# Patient Record
Sex: FEMALE | Race: WHITE | HISPANIC OR LATINO | Employment: UNEMPLOYED | URBAN - METROPOLITAN AREA
[De-identification: names, ages, dates, MRNs, and addresses within clinical notes are randomized per-mention and may not be internally consistent; named-entity substitution may affect disease eponyms.]

---

## 2017-06-21 ENCOUNTER — ALLSCRIPTS OFFICE VISIT (OUTPATIENT)
Dept: OTHER | Facility: OTHER | Age: 32
End: 2017-06-21

## 2017-09-01 ENCOUNTER — ALLSCRIPTS OFFICE VISIT (OUTPATIENT)
Dept: OTHER | Facility: OTHER | Age: 32
End: 2017-09-01

## 2017-09-01 DIAGNOSIS — E66.9 OBESITY: ICD-10-CM

## 2017-10-25 NOTE — PROGRESS NOTES
Assessment  1  Asthma exacerbation (493 92) (J45 901)   2  Cigarette smoker (305 1) (F17 210)   3  Adjustment disorder with anxiety (309 24) (F43 22)   4  Obese (278 00) (E66 9)    Plan   Asthma exacerbation    · Proventil  (90 Base) MCG/ACT Inhalation Aerosol Solution; 2 puffs qid prn dyspnea  Obese    · (1) CBC/PLT/DIFF; Status:Active; Requested for:01Sep2017;    · (1) COMPREHENSIVE METABOLIC PANEL; Status:Active; Requested for:01Sep2017;    · (1) HEMOGLOBIN A1C; Status:Active; Requested for:01Sep2017;    · (1) LIPID PANEL, FASTING; Status:Active; Requested for:01Sep2017;    · (1) TSH; Status:Active; Requested for:01Sep2017;    · (1) URINALYSIS w URINE C/S REFLEX (will reflex a microscopy if leukocytes, occult blood, or  nitrites are not within normal limits); Status:Active; Requested for:01Sep2017;    · Follow-up PRN Evaluation and Treatment  Follow-up  Status: Complete  Done: [id number]    ALPRAZolam 0 5 MG Oral Tablet; TAKE 1 TABLET 3 TIMES DAILY AS NEEDED; Therapy: 21Jun2017 to (Evaluate:11Sep2017); Last Rx:01Sep2017; Status: ACTIVE Ordered  Rx By: Maranda Corcoran; Dispense: 10 Days ; #:30 Tablet; Refill: 0;   For: Adjustment disorder with anxiety; KHANH = N; Print Rx     Discussion/Summary  Discussion Summary:   Renewed her Xanax getting screening labs  Medication SE Review and Pt Understands Tx: Possible side effects of new medications were reviewed with the patient/guardian today  The treatment plan was reviewed with the patient/guardian  The patient/guardian understands and agrees with the treatment plan      Chief Complaint  Chief Complaint Free Text Note Form: Anxiety      History of Present Illness  HPI: He continues to have occasional anxiety symptoms  A lot of stress with her twin sister's medical problems  She takes Xanax infrequently but it helps her anxiety symptoms  She has no other complaints  She has asthma which is well controlled   Asthma (Follow-Up):  The patient is being seen for a routine clinic follow-up of asthma  The patient states she has been doing well with her asthma control since the last visit  She has no comorbid illnesses  Interval Symptoms:   Disease Management: the patient is doing well with her asthma goals  Obesity (Follow-Up): The patient is being seen for follow-up of obesity  The patient reports no change in the condition  She has had no significant interval events  The patient is currently asymptomatic  Review of Systems  Complete-Female:   Constitutional: No fever, no chills, feels well, no tiredness, no recent weight gain or weight loss  Eyes: No complaints of eye pain, no red eyes, no eyesight problems, no discharge, no dry eyes, no itching of eyes  ENT: no complaints of earache, no loss of hearing, no nose bleeds, no nasal discharge, no sore throat, no hoarseness  Cardiovascular: No complaints of slow heart rate, no fast heart rate, no chest pain, no palpitations, no leg claudication, no lower extremity edema  Respiratory: No complaints of shortness of breath, no wheezing, no cough, no SOB on exertion, no orthopnea, no PND  Gastrointestinal: No complaints of abdominal pain, no constipation, no nausea or vomiting, no diarrhea, no bloody stools  Genitourinary: No complaints of dysuria, no incontinence, no pelvic pain, no dysmenorrhea, no vaginal discharge or bleeding  Musculoskeletal: No complaints of arthralgias, no myalgias, no joint swelling or stiffness, no limb pain or swelling  Integumentary: No complaints of skin rash or lesions, no itching, no skin wounds, no breast pain or lump  Neurological: No complaints of headache, no confusion, no convulsions, no numbness, no dizziness or fainting, no tingling, no limb weakness, no difficulty walking  Psychiatric: anxiety, but-- as noted in HPI,-- not suicidal,-- no personality change,-- no sleep disturbances,-- no depression-- and-- no emotional problems     Endocrine: No complaints of proptosis, no hot flashes, no muscle weakness, no deepening of the voice, no feelings of weakness  Hematologic/Lymphatic: No complaints of swollen glands, no swollen glands in the neck, does not bleed easily, does not bruise easily  ROS Reviewed:   ROS reviewed  Active Problems  1  Acute bronchitis (466 0) (J20 9)   2  Adjustment disorder with anxiety (309 24) (F43 22)   3  Asthma exacerbation (493 92) (J45 901)   4  Cigarette smoker (305 1) (F17 210)   5  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)   6  Pregnancy (V22 2) (Z34 90)    Past Medical History  1  History of Denial (799 29) (R45 89)  Active Problems And Past Medical History Reviewed: The active problems and past medical history were reviewed and updated today  Surgical History  1  Denied: History of Recent Surgery  Surgical History Reviewed: The surgical history was reviewed and updated today  Family History  Family History    1  Family history of diabetes mellitus (V18 0) (Z83 3)  Family History Reviewed: The family history was reviewed and updated today  Social History   · Cigarette smoker (305 1) (F17 210)   · Current every day smoker (305 1) (F17 200)  Social History Reviewed: The social history was reviewed and updated today  Current Meds   1  ALPRAZolam 0 5 MG Oral Tablet; TAKE 1 TABLET 3 TIMES DAILY AS NEEDED; Therapy: 21Jun2017 to (Evaluate:[id number]); Last Rx:21Jun2017 Ordered   2  Sudafed 24 Hour 240 MG Oral Tablet Extended Release 24 Hour; TAKE 1 TABLET DAILY AS   DIRECTED; Therapy: [id number] to (Evaluate:[id number])  Requested for: 65GYI5317; Last Rx:22Jun2017   Ordered   3  Tylenol TABS; TAKE 1 TO 2 TABLETS EVERY 6 HOURS AS NEEDED Recorded  Medication List Reviewed: The medication list was reviewed and updated today  Allergies  1   No Known Drug Allergies    Vitals  Vital Signs    Recorded: 01Sep2017 02:50PM   Temperature 98 4 F   Heart Rate 93   Systolic 359   Diastolic 78   Height 4 ft 11 in Weight 163 lb 4 oz   BMI Calculated 32 97   BSA Calculated 1 69   O2 Saturation 97     Physical Exam    Constitutional   General appearance: Abnormal   obese  Eyes   Conjunctiva and lids: No swelling, erythema or discharge  Pupils and irises: Equal, round and reactive to light  Ears, Nose, Mouth, and Throat   External inspection of ears and nose: Normal     Otoscopic examination: Tympanic membranes translucent with normal light reflex  Canals patent without erythema  Nasal mucosa, septum, and turbinates: Normal without edema or erythema  Oropharynx: Normal with no erythema, edema, exudate or lesions  Pulmonary   Respiratory effort: No increased work of breathing or signs of respiratory distress  Auscultation of lungs: Clear to auscultation  Cardiovascular   Palpation of heart: Normal PMI, no thrills  Auscultation of heart: Normal rate and rhythm, normal S1 and S2, without murmurs  Examination of extremities for edema and/or varicosities: Normal     Carotid pulses: Normal     Abdomen   Abdomen: Non-tender, no masses  Liver and spleen: No hepatomegaly or splenomegaly  Lymphatic   Palpation of lymph nodes in neck: No lymphadenopathy  Musculoskeletal   Gait and station: Normal     Digits and nails: Normal without clubbing or cyanosis  Inspection/palpation of joints, bones, and muscles: Normal     Skin   Skin and subcutaneous tissue: Normal without rashes or lesions  Neurologic   Cranial nerves: Cranial nerves 2-12 intact  Reflexes: 2+ and symmetric  Sensation: No sensory loss  Psychiatric   Orientation to person, place, and time: Normal     Mood and affect: Normal          Results/Data  PHQ-2 Adult Depression Screening [id number] 02:48PM User, Cathy     Test Name Result Flag Reference   PHQ-2 Adult Depression Score 0     Over the last two weeks, how often have you been bothered by any of the following problems?   Little interest or pleasure in doing things: Not at all - 0  Feeling down, depressed, or hopeless: Not at all - 0   PHQ-2 Adult Depression Screening Negative         Signatures   Electronically signed by : Rm Rangel [facility]; Sep  1 2017  5:49PM EST                       (Author)    Electronically signed by : JOB Chavez ; Sep  2 2017 12:49PM EST

## 2017-12-21 ENCOUNTER — ALLSCRIPTS OFFICE VISIT (OUTPATIENT)
Dept: OTHER | Facility: OTHER | Age: 32
End: 2017-12-21

## 2017-12-22 NOTE — PROGRESS NOTES
Assessment   1  Adjustment disorder with anxiety (309 24) (F43 22)   2  Dental infection (522 4) (K04 7)   3  Obese (278 00) (E66 9)    Plan   Adjustment disorder with anxiety    · ALPRAZolam 0 5 MG Oral Tablet; TAKE 1 TABLET 3 TIMES DAILY AS NEEDED  Dental infection    · Clindamycin HCl - 300 MG Oral Capsule; TAKE 1 CAPSULE EVERY 6 HOURS DAILY   · TraMADol HCl - 50 MG Oral Tablet; take 1 tablet by mouth twice a day if needed    Discussion/Summary   Discussion Summary:    Clindamycin for the dental infection to see the dentist as soon as possible  I renewed her Xanax [facility]  Medication SE Review and Pt Understands Tx: Possible side effects of new medications were reviewed with the patient/guardian today  The treatment plan was reviewed with the patient/guardian  The patient/guardian understands and agrees with the treatment plan      Chief Complaint   Chief Complaint Free Text Note Form: Painful tooth      History of Present Illness   HPI: she lost a filling min broke a tooth right upper rear molar  Now she has a lot of pain around the tooth and gum no drainage some swelling  her appointment with the dentist is not for a few weeks  He also some increasing anxiety continues to be concerned about her identical twin who is drug abusing has had several serious related medical problems    Anxiety Disorder (Follow-Up): The patient is being seen for follow-up of anxiety  The patient reports no change in the condition  She has no comorbid illnesses  She has had no significant interval events  The patient is currently asymptomatic  Disease management:  the patient is doing well with her goals  Obesity (Brief): The patient is being seen for a routine clinic follow-up of obesity  The patient is currently asymptomatic  No associated symptoms are reported        Review of Systems   Complete-Female:      Constitutional: No fever, no chills, feels well, no tiredness, no recent weight gain or weight loss  Eyes: No complaints of eye pain, no red eyes, no eyesight problems, no discharge, no dry eyes, no itching of eyes  ENT: as noted in HPI  Cardiovascular: No complaints of slow heart rate, no fast heart rate, no chest pain, no palpitations, no leg claudication, no lower extremity edema  Respiratory: No complaints of shortness of breath, no wheezing, no cough, no SOB on exertion, no orthopnea, no PND  Gastrointestinal: No complaints of abdominal pain, no constipation, no nausea or vomiting, no diarrhea, no bloody stools  Genitourinary: No complaints of dysuria, no incontinence, no pelvic pain, no dysmenorrhea, no vaginal discharge or bleeding  Musculoskeletal: No complaints of arthralgias, no myalgias, no joint swelling or stiffness, no limb pain or swelling  Integumentary: No complaints of skin rash or lesions, no itching, no skin wounds, no breast pain or lump  Neurological: No complaints of headache, no confusion, no convulsions, no numbness, no dizziness or fainting, no tingling, no limb weakness, no difficulty walking  Psychiatric: as noted in HPI  Endocrine: No complaints of proptosis, no hot flashes, no muscle weakness, no deepening of the voice, no feelings of weakness  Hematologic/Lymphatic: No complaints of swollen glands, no swollen glands in the neck, does not bleed easily, does not bruise easily  ROS Reviewed:    ROS reviewed  Active Problems   1  Acute bronchitis (466 0) (J20 9)   2  Adjustment disorder with anxiety (309 24) (F43 22)   3  Asthma exacerbation (493 92) (J45 901)   4  Cigarette smoker (305 1) (F17 210)   5  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)   6  Obese (278 00) (E66 9)   7  Pregnancy (V22 2) (Z34 90)    Past Medical History   1  History of Denial (799 29) (R45 89)  Active Problems And Past Medical History Reviewed:     The active problems and past medical history were reviewed and updated today  Surgical History   1  Denied: History of Recent Surgery  Surgical History Reviewed: The surgical history was reviewed and updated today  Family History   Family History    1  Family history of diabetes mellitus (V18 0) (Z83 3)  Family History Reviewed: The family history was reviewed and updated today  Social History    · Cigarette smoker (305 1) (F17 210)   · Current every day smoker (305 1) (F17 200)  Social History Reviewed: The social history was reviewed and updated today  Current Meds    1  ALPRAZolam 0 5 MG Oral Tablet; TAKE 1 TABLET 3 TIMES DAILY AS NEEDED; Therapy: 21Jun2017 to (Evaluate:11Sep2017); Last Rx:01Sep2017 Ordered   2  Proventil  (90 Base) MCG/ACT Inhalation Aerosol Solution; 2 puffs qid prn dyspnea; Therapy: 01Sep2017 to (Last Rx:01Sep2017)  Requested for: 01Sep2017 Ordered   3  Sudafed 24 Hour 240 MG Oral Tablet Extended Release 24 Hour; TAKE 1 TABLET DAILY AS     DIRECTED; Therapy: 37PIV5962 to (Evaluate:48Qho7852)  Requested for: 19IAT9344; Last Rx:22Jun2017     Ordered   4  Tylenol TABS; TAKE 1 TO 2 TABLETS EVERY 6 HOURS AS NEEDED Recorded  Medication List Reviewed: The medication list was reviewed and updated today  Allergies   1  No Known Drug Allergies    Vitals   Vital Signs    Recorded: 21Dec2017 10:45AM   Temperature 98 1 F, Oral   Heart Rate 88   Systolic 098   Diastolic 80   Height 4 ft 11 in   Weight 166 lb    BMI Calculated 33 53   BSA Calculated 1 7   O2 Saturation 99     Physical Exam        Constitutional      General appearance: No acute distress, well appearing and well nourished  Eyes      Conjunctiva and lids: No swelling, erythema or discharge  Pupils and irises: Equal, round and reactive to light  Ears, Nose, Mouth, and Throat      External inspection of ears and nose: Normal        Otoscopic examination: Tympanic membranes translucent with normal light reflex   Canals patent without erythema  Nasal mucosa, septum, and turbinates: Normal without edema or erythema  Oropharynx: Abnormal  -- Missing right upper rear molar with inflammation at the gum with a tooth used to be some tenderness at the maxilla on that spot  Pulmonary      Respiratory effort: No increased work of breathing or signs of respiratory distress  Auscultation of lungs: Clear to auscultation  Cardiovascular      Palpation of heart: Normal PMI, no thrills  Auscultation of heart: Normal rate and rhythm, normal S1 and S2, without murmurs  Examination of extremities for edema and/or varicosities: Normal        Carotid pulses: Normal        Abdomen      Abdomen: Non-tender, no masses  Liver and spleen: No hepatomegaly or splenomegaly  Lymphatic      Palpation of lymph nodes in neck: No lymphadenopathy  Musculoskeletal      Gait and station: Normal        Digits and nails: Normal without clubbing or cyanosis  Inspection/palpation of joints, bones, and muscles: Normal        Skin      Skin and subcutaneous tissue: Normal without rashes or lesions  Neurologic      Cranial nerves: Cranial nerves 2-12 intact  Reflexes: 2+ and symmetric  Sensation: No sensory loss         Psychiatric      Orientation to person, place, and time: Normal        Mood and affect: Normal           Signatures    Electronically signed by : Prasanth Zapata, [facility]; Dec 21 2017  2:51PM EST                       (Author)     Electronically signed by : JOB Choe ; Dec 21 2017  5:56PM EST

## 2018-01-12 VITALS
DIASTOLIC BLOOD PRESSURE: 78 MMHG | BODY MASS INDEX: 32.91 KG/M2 | HEIGHT: 59 IN | SYSTOLIC BLOOD PRESSURE: 118 MMHG | OXYGEN SATURATION: 97 % | HEART RATE: 93 BPM | WEIGHT: 163.25 LBS | TEMPERATURE: 98.4 F

## 2018-01-14 VITALS
DIASTOLIC BLOOD PRESSURE: 80 MMHG | OXYGEN SATURATION: 97 % | BODY MASS INDEX: 31.75 KG/M2 | HEART RATE: 98 BPM | HEIGHT: 59 IN | TEMPERATURE: 98.3 F | WEIGHT: 157.5 LBS | SYSTOLIC BLOOD PRESSURE: 104 MMHG

## 2018-01-23 VITALS
BODY MASS INDEX: 33.47 KG/M2 | TEMPERATURE: 98.1 F | DIASTOLIC BLOOD PRESSURE: 80 MMHG | SYSTOLIC BLOOD PRESSURE: 116 MMHG | HEART RATE: 88 BPM | OXYGEN SATURATION: 99 % | WEIGHT: 166 LBS | HEIGHT: 59 IN

## 2018-03-08 ENCOUNTER — OFFICE VISIT (OUTPATIENT)
Dept: INTERNAL MEDICINE CLINIC | Facility: CLINIC | Age: 33
End: 2018-03-08
Payer: COMMERCIAL

## 2018-03-08 VITALS
TEMPERATURE: 99.2 F | WEIGHT: 168.6 LBS | DIASTOLIC BLOOD PRESSURE: 90 MMHG | SYSTOLIC BLOOD PRESSURE: 122 MMHG | OXYGEN SATURATION: 96 % | HEIGHT: 59 IN | BODY MASS INDEX: 33.99 KG/M2 | HEART RATE: 103 BPM

## 2018-03-08 DIAGNOSIS — F41.9 ANXIETY: ICD-10-CM

## 2018-03-08 DIAGNOSIS — J40 BRONCHITIS: Primary | ICD-10-CM

## 2018-03-08 PROCEDURE — 99214 OFFICE O/P EST MOD 30 MIN: CPT | Performed by: PHYSICIAN ASSISTANT

## 2018-03-08 RX ORDER — ALPRAZOLAM 0.5 MG/1
0.5 TABLET ORAL 3 TIMES DAILY PRN
Qty: 30 TABLET | Refills: 0 | Status: SHIPPED | OUTPATIENT
Start: 2018-03-08 | End: 2018-07-11 | Stop reason: SDUPTHER

## 2018-03-08 RX ORDER — AMOXICILLIN AND CLAVULANATE POTASSIUM 875; 125 MG/1; MG/1
1 TABLET, FILM COATED ORAL EVERY 12 HOURS SCHEDULED
Qty: 20 TABLET | Refills: 0 | Status: SHIPPED | OUTPATIENT
Start: 2018-03-08 | End: 2018-03-18

## 2018-03-08 RX ORDER — BENZONATATE 200 MG/1
200 CAPSULE ORAL 3 TIMES DAILY PRN
Qty: 30 CAPSULE | Refills: 0 | Status: ON HOLD | OUTPATIENT
Start: 2018-03-08 | End: 2021-11-15 | Stop reason: ALTCHOICE

## 2018-03-08 RX ORDER — PROMETHAZINE HYDROCHLORIDE AND CODEINE PHOSPHATE 6.25; 1 MG/5ML; MG/5ML
5 SYRUP ORAL EVERY 4 HOURS PRN
Qty: 240 ML | Refills: 0 | Status: ON HOLD | OUTPATIENT
Start: 2018-03-08 | End: 2021-11-15 | Stop reason: ALTCHOICE

## 2018-03-08 RX ORDER — ALPRAZOLAM 0.5 MG/1
1 TABLET ORAL 3 TIMES DAILY PRN
COMMUNITY
Start: 2017-06-21 | End: 2018-03-08 | Stop reason: SDUPTHER

## 2018-03-08 NOTE — LETTER
March 8, 2018     Patient: Jennifer Ellis   YOB: 1985   Date of Visit: 3/8/2018       To Whom it May Concern:    Jennifer Ellis is under my professional care  She was seen in my office on 3/8/2018  She may return to work on 3/12/18  Please excuse absence from 3/5-3/11  If you have any questions or concerns, please don't hesitate to call           Sincerely,          Shelly Smith PA-C        CC: No Recipients

## 2018-03-08 NOTE — PROGRESS NOTES
Assessment/Plan:      Bronchitis/sinusitis - Augmentin with Phenergan with codeine and Tessalon Perles  Symptoms do not improve, notify the office  Question of the flu -she is likely outside the window period of treatment since flu symptoms started at least 4 days ago  Rest, plenty of fluids, a note for missed work is given to the patient      No problem-specific Assessment & Plan notes found for this encounter  Diagnoses and all orders for this visit:    Bronchitis  -     amoxicillin-clavulanate (AUGMENTIN) 875-125 mg per tablet; Take 1 tablet by mouth every 12 (twelve) hours for 10 days  -     promethazine-codeine (PHENERGAN WITH CODEINE) 6 25-10 mg/5 mL syrup; Take 5 mL by mouth every 4 (four) hours as needed for cough  -     benzonatate (TESSALON) 200 MG capsule; Take 1 capsule (200 mg total) by mouth 3 (three) times a day as needed for cough    Anxiety  -     ALPRAZolam (XANAX) 0 5 mg tablet; Take 1 tablet (0 5 mg total) by mouth 3 (three) times a day as needed for anxiety    Other orders  -     Discontinue: ALPRAZolam (XANAX) 0 5 mg tablet; Take 1 tablet by mouth 3 (three) times a day as needed          Subjective:      Patient ID: Aniyah Acuna is a 29 y o  female  Patient comes in complaining of symptoms that began 5 days ago  It started on Saturday or Sunday, March 3rd or 4th  She began having coughing and pain and tightening in her chest with coughing  Her cough is sometimes dry and sometimes productive of green mucus  She says right now she feels freezing but she has a fever of   99  Before she came here she took her temperature at home it was 101 7 and she took Tylenol  She says last night her temperature was 102  When her symptoms 1st began she had low-grade fevers in the  range  She has body aches, headache, sinus pressure  No sore throat or ear pain          The following portions of the patient's history were reviewed and updated as appropriate: allergies, current medications, past family history, past medical history, past social history, past surgical history and problem list     Review of Systems   Constitutional: Positive for chills, diaphoresis, fatigue and fever  HENT: Positive for congestion and sinus pressure  Negative for ear pain, postnasal drip, rhinorrhea, sinus pain and sore throat  Respiratory: Positive for cough and chest tightness  Negative for shortness of breath  Cardiovascular: Negative for chest pain and palpitations  Gastrointestinal: Negative for abdominal pain, diarrhea and nausea  Musculoskeletal: Positive for myalgias  Objective:      /90   Pulse 103   Temp 99 2 °F (37 3 °C) (Tympanic)   Ht 4' 11" (1 499 m)   Wt 76 5 kg (168 lb 9 6 oz)   SpO2 96%   BMI 34 05 kg/m²          Physical Exam   Constitutional: She appears well-developed and well-nourished  She appears ill  HENT:   Head: Normocephalic and atraumatic  Right Ear: External ear normal    Left Ear: External ear normal    Mouth/Throat: Oropharynx is clear and moist  No oropharyngeal exudate  Eyes: Pupils are equal, round, and reactive to light  Neck: Normal range of motion  Neck supple  Cardiovascular: Normal rate, regular rhythm and normal heart sounds  Pulmonary/Chest: Effort normal and breath sounds normal  No respiratory distress  She has no wheezes  Abdominal: Soft  Bowel sounds are normal  There is no tenderness  Lymphadenopathy:     She has no cervical adenopathy

## 2018-05-09 DIAGNOSIS — Z11.1 SCREENING FOR TUBERCULOSIS: Primary | ICD-10-CM

## 2018-05-16 ENCOUNTER — CLINICAL SUPPORT (OUTPATIENT)
Dept: INTERNAL MEDICINE CLINIC | Facility: CLINIC | Age: 33
End: 2018-05-16
Payer: COMMERCIAL

## 2018-05-16 DIAGNOSIS — Z23 NEED FOR TUBERCULOSIS VACCINATION: Primary | ICD-10-CM

## 2018-05-16 PROCEDURE — 86580 TB INTRADERMAL TEST: CPT

## 2018-05-18 LAB
INDURATION: 0 MM
TB SKIN TEST: NEGATIVE

## 2018-07-11 ENCOUNTER — OFFICE VISIT (OUTPATIENT)
Dept: INTERNAL MEDICINE CLINIC | Facility: CLINIC | Age: 33
End: 2018-07-11
Payer: COMMERCIAL

## 2018-07-11 VITALS
HEIGHT: 59 IN | TEMPERATURE: 98.5 F | HEART RATE: 103 BPM | WEIGHT: 157.4 LBS | SYSTOLIC BLOOD PRESSURE: 124 MMHG | DIASTOLIC BLOOD PRESSURE: 82 MMHG | BODY MASS INDEX: 31.73 KG/M2 | OXYGEN SATURATION: 99 %

## 2018-07-11 DIAGNOSIS — F41.9 ANXIETY: ICD-10-CM

## 2018-07-11 PROBLEM — F43.22 ADJUSTMENT DISORDER WITH ANXIETY: Status: ACTIVE | Noted: 2017-06-21

## 2018-07-11 PROCEDURE — 99214 OFFICE O/P EST MOD 30 MIN: CPT | Performed by: PHYSICIAN ASSISTANT

## 2018-07-11 PROCEDURE — 3725F SCREEN DEPRESSION PERFORMED: CPT | Performed by: PHYSICIAN ASSISTANT

## 2018-07-11 PROCEDURE — 3008F BODY MASS INDEX DOCD: CPT | Performed by: PHYSICIAN ASSISTANT

## 2018-07-11 RX ORDER — PAROXETINE 10 MG/1
10 TABLET, FILM COATED ORAL DAILY
Qty: 90 TABLET | Refills: 1 | Status: SHIPPED | OUTPATIENT
Start: 2018-07-11 | End: 2019-01-23 | Stop reason: CLARIF

## 2018-07-11 RX ORDER — ALPRAZOLAM 0.5 MG/1
0.5 TABLET ORAL 3 TIMES DAILY PRN
Qty: 30 TABLET | Refills: 0 | Status: ON HOLD | OUTPATIENT
Start: 2018-07-11 | End: 2021-11-15 | Stop reason: ALTCHOICE

## 2018-07-11 RX ORDER — ALPRAZOLAM 0.5 MG/1
0.5 TABLET ORAL 3 TIMES DAILY PRN
Qty: 30 TABLET | Refills: 0 | Status: SHIPPED | OUTPATIENT
Start: 2018-07-11 | End: 2018-07-11 | Stop reason: SDUPTHER

## 2018-07-11 NOTE — PROGRESS NOTES
Assessment/Plan:  A lot of anxiety and panic symptoms will start her on Paxil 10 mg a day for a week increasing by 10 mg each week follow-up in 4 weeks will give her a small prescription for Xanax  Her last prescription was several months ago  She has not been abusing drugs lately I urged her to go to group meetings and get psych help her physical exam is unremarkable she get lab work done which was ordered several months ago     Diagnoses and all orders for this visit:    Anxiety  -     Discontinue: ALPRAZolam (XANAX) 0 5 mg tablet; Take 1 tablet (0 5 mg total) by mouth 3 (three) times a day as needed for anxiety  -     PARoxetine (PAXIL) 10 mg tablet; Take 1 tablet (10 mg total) by mouth daily  -     ALPRAZolam (XANAX) 0 5 mg tablet; Take 1 tablet (0 5 mg total) by mouth 3 (three) times a day as needed for anxiety        No problem-specific Assessment & Plan notes found for this encounter  Subjective:      Patient ID: Alfredo Sánchez is a 29 y o  female  Severe anxiety symptoms with panic episodes and poor sleeping for the past couple of months  Having a lot of trouble with her twin sister who was abusing substances and stealing from her lots of family problems as well  She has a history of addiction years ago  She is not abusing any drugs currently no alcohol  Her appetite has been okay she is working every day  She is anxious and depressed but she is not suicidal she says she has not had time to see a counselor or go to a group meeting      Medication Refill   Pertinent negatives include no abdominal pain, arthralgias, chest pain, chills, congestion, coughing, diaphoresis, fatigue, fever, headaches, joint swelling, myalgias, nausea, neck pain, numbness, rash, sore throat, vomiting or weakness  The following portions of the patient's history were reviewed and updated as appropriate:   She has no past medical history on file  ,   does not have any pertinent problems on file  ,   has a past surgical history that includes No past surgeries  ,  family history includes Diabetes in her family  ,   reports that she has been smoking Cigarettes  She has never used smokeless tobacco  She reports that she does not drink alcohol or use drugs  ,  is allergic to latex     Current Outpatient Prescriptions   Medication Sig Dispense Refill    ALPRAZolam (XANAX) 0 5 mg tablet Take 1 tablet (0 5 mg total) by mouth 3 (three) times a day as needed for anxiety 30 tablet 0    benzonatate (TESSALON) 200 MG capsule Take 1 capsule (200 mg total) by mouth 3 (three) times a day as needed for cough 30 capsule 0    PARoxetine (PAXIL) 10 mg tablet Take 1 tablet (10 mg total) by mouth daily 90 tablet 1    promethazine-codeine (PHENERGAN WITH CODEINE) 6 25-10 mg/5 mL syrup Take 5 mL by mouth every 4 (four) hours as needed for cough 240 mL 0     No current facility-administered medications for this visit  Review of Systems   Constitutional: Negative for activity change, appetite change, chills, diaphoresis, fatigue, fever and unexpected weight change  HENT: Negative for congestion, dental problem, drooling, ear discharge, ear pain, facial swelling, hearing loss, nosebleeds, postnasal drip, rhinorrhea, sinus pain, sinus pressure, sneezing, sore throat, tinnitus, trouble swallowing and voice change  Eyes: Negative for photophobia, pain, discharge, redness, itching and visual disturbance  Respiratory: Negative for apnea, cough, choking, chest tightness, shortness of breath, wheezing and stridor  Cardiovascular: Negative for chest pain, palpitations and leg swelling  Gastrointestinal: Negative for abdominal distention, abdominal pain, anal bleeding, blood in stool, constipation, diarrhea, nausea, rectal pain and vomiting  Endocrine: Negative for cold intolerance, heat intolerance, polydipsia, polyphagia and polyuria     Genitourinary: Negative for decreased urine volume, difficulty urinating, dysuria, enuresis, flank pain, frequency, genital sores, hematuria and urgency  Musculoskeletal: Negative for arthralgias, back pain, gait problem, joint swelling, myalgias, neck pain and neck stiffness  Skin: Negative for color change, pallor, rash and wound  Neurological: Negative for dizziness, tremors, seizures, syncope, facial asymmetry, speech difficulty, weakness, light-headedness, numbness and headaches  Hematological: Negative for adenopathy  Does not bruise/bleed easily  Psychiatric/Behavioral: Positive for dysphoric mood  Negative for agitation, behavioral problems, confusion, decreased concentration, hallucinations, self-injury, sleep disturbance and suicidal ideas  The patient is not nervous/anxious and is not hyperactive  Objective:  Vitals:    07/11/18 1659   BP: 124/82   BP Location: Left arm   Patient Position: Sitting   Pulse: 103   Temp: 98 5 °F (36 9 °C)   SpO2: 99%   Weight: 71 4 kg (157 lb 6 4 oz)   Height: 4' 11" (1 499 m)     Body mass index is 31 79 kg/m²  Physical Exam   Constitutional: She is oriented to person, place, and time  She appears well-developed  Obese   HENT:   Head: Normocephalic  Right Ear: External ear normal    Left Ear: External ear normal    Mouth/Throat: Oropharynx is clear and moist    Eyes: Conjunctivae are normal  Pupils are equal, round, and reactive to light  Neck: Neck supple  No thyromegaly present  Cardiovascular: Normal rate, regular rhythm, normal heart sounds and intact distal pulses  No murmur heard  Pulmonary/Chest: Effort normal and breath sounds normal  No respiratory distress  Abdominal: Soft  Bowel sounds are normal    Musculoskeletal: Normal range of motion  She exhibits no edema or tenderness  Lymphadenopathy:     She has no cervical adenopathy  Neurological: She is alert and oriented to person, place, and time  She has normal reflexes  She displays normal reflexes  No cranial nerve deficit  She exhibits normal muscle tone   Coordination normal    Skin: Skin is warm and dry  Psychiatric: She has a normal mood and affect   Her behavior is normal  Judgment and thought content normal

## 2018-08-25 ENCOUNTER — TELEPHONE (OUTPATIENT)
Dept: INTERNAL MEDICINE CLINIC | Facility: CLINIC | Age: 33
End: 2018-08-25

## 2018-08-25 NOTE — TELEPHONE ENCOUNTER
Spoke to patient and advise doctor was done for the day as per Dr Alisa Crowley  Patient said she will go to the ER

## 2018-12-27 ENCOUNTER — TELEPHONE (OUTPATIENT)
Dept: INTERNAL MEDICINE CLINIC | Facility: CLINIC | Age: 33
End: 2018-12-27

## 2018-12-27 NOTE — TELEPHONE ENCOUNTER
Answering service 12/27/2018 5:21 am   Pt eyes are burning and irritating, pt  Says they may have gotten sand paper in eyes

## 2019-01-23 ENCOUNTER — OFFICE VISIT (OUTPATIENT)
Dept: INTERNAL MEDICINE CLINIC | Facility: CLINIC | Age: 34
End: 2019-01-23
Payer: COMMERCIAL

## 2019-01-23 VITALS
BODY MASS INDEX: 32.28 KG/M2 | TEMPERATURE: 98.5 F | SYSTOLIC BLOOD PRESSURE: 112 MMHG | DIASTOLIC BLOOD PRESSURE: 80 MMHG | OXYGEN SATURATION: 99 % | WEIGHT: 159.8 LBS | HEART RATE: 53 BPM

## 2019-01-23 DIAGNOSIS — J06.9 VIRAL UPPER RESPIRATORY TRACT INFECTION: Primary | ICD-10-CM

## 2019-01-23 DIAGNOSIS — F43.22 ADJUSTMENT DISORDER WITH ANXIETY: ICD-10-CM

## 2019-01-23 PROCEDURE — 99214 OFFICE O/P EST MOD 30 MIN: CPT | Performed by: PHYSICIAN ASSISTANT

## 2019-01-23 RX ORDER — PROMETHAZINE HYDROCHLORIDE 25 MG/1
25 TABLET ORAL EVERY 6 HOURS PRN
Qty: 30 TABLET | Refills: 0 | Status: ON HOLD | OUTPATIENT
Start: 2019-01-23 | End: 2021-11-15 | Stop reason: ALTCHOICE

## 2019-01-23 RX ORDER — ALBUTEROL SULFATE 90 UG/1
2 AEROSOL, METERED RESPIRATORY (INHALATION) EVERY 6 HOURS PRN
Qty: 1 INHALER | Refills: 3 | Status: SHIPPED | OUTPATIENT
Start: 2019-01-23

## 2019-01-23 RX ORDER — IBUPROFEN 800 MG/1
800 TABLET ORAL EVERY 6 HOURS PRN
Qty: 30 TABLET | Refills: 0 | Status: ON HOLD | OUTPATIENT
Start: 2019-01-23 | End: 2021-11-15 | Stop reason: ALTCHOICE

## 2019-01-23 RX ORDER — ONDANSETRON 4 MG/1
4 TABLET, ORALLY DISINTEGRATING ORAL EVERY 6 HOURS PRN
Qty: 20 TABLET | Refills: 0 | Status: ON HOLD | OUTPATIENT
Start: 2019-01-23 | End: 2021-11-15 | Stop reason: ALTCHOICE

## 2019-01-23 NOTE — PROGRESS NOTES
Assessment/Plan:  Symptoms consistent with viral illness treat her symptomatically with Zofran for nausea anti-inflammatories should return next week if she is not feeling better  Complaining of anxiety due to a lot of stress I gave her Phenergan for this       Diagnoses and all orders for this visit:    Viral upper respiratory tract infection  -     ondansetron (ZOFRAN-ODT) 4 mg disintegrating tablet; Take 1 tablet (4 mg total) by mouth every 6 (six) hours as needed for nausea or vomiting  -     ibuprofen (MOTRIN) 800 mg tablet; Take 1 tablet (800 mg total) by mouth every 6 (six) hours as needed for mild pain  -     Phenyleph-Doxylamine-DM-APAP (NYQUIL SEVERE COLD/FLU) 5-6 25- MG/15ML LIQD; Take 15 mL by mouth daily at bedtime  -     albuterol (VENTOLIN HFA) 90 mcg/act inhaler; Inhale 2 puffs every 6 (six) hours as needed for wheezing    Adjustment disorder with anxiety  -     ondansetron (ZOFRAN-ODT) 4 mg disintegrating tablet; Take 1 tablet (4 mg total) by mouth every 6 (six) hours as needed for nausea or vomiting  -     promethazine (PHENERGAN) 25 mg tablet; Take 1 tablet (25 mg total) by mouth every 6 (six) hours as needed for nausea or vomiting  -     ibuprofen (MOTRIN) 800 mg tablet; Take 1 tablet (800 mg total) by mouth every 6 (six) hours as needed for mild pain  -     Phenyleph-Doxylamine-DM-APAP (NYQUIL SEVERE COLD/FLU) 5-6 25- MG/15ML LIQD; Take 15 mL by mouth daily at bedtime  -     albuterol (VENTOLIN HFA) 90 mcg/act inhaler; Inhale 2 puffs every 6 (six) hours as needed for wheezing        No problem-specific Assessment & Plan notes found for this encounter  Subjective:      Patient ID: Maritza Penaloza is a 29 y o  female  Three day history of generalized aching fever chills constant nausea  One episode of vomiting this morning she is able to drink fluids she has had some loose stools she has a slight cough a scratchy throat and nasal congestion    Active stable until her present problem began a lot of stress at home        The following portions of the patient's history were reviewed and updated as appropriate:   She has no past medical history on file  ,   does not have any pertinent problems on file  ,   has a past surgical history that includes No past surgeries  ,  family history includes Diabetes in her family  ,   reports that she has been smoking Cigarettes  She has never used smokeless tobacco  She reports that she does not drink alcohol or use drugs  ,  is allergic to latex     Current Outpatient Prescriptions   Medication Sig Dispense Refill    ALPRAZolam (XANAX) 0 5 mg tablet Take 1 tablet (0 5 mg total) by mouth 3 (three) times a day as needed for anxiety 30 tablet 0    benzonatate (TESSALON) 200 MG capsule Take 1 capsule (200 mg total) by mouth 3 (three) times a day as needed for cough 30 capsule 0    promethazine-codeine (PHENERGAN WITH CODEINE) 6 25-10 mg/5 mL syrup Take 5 mL by mouth every 4 (four) hours as needed for cough 240 mL 0    albuterol (VENTOLIN HFA) 90 mcg/act inhaler Inhale 2 puffs every 6 (six) hours as needed for wheezing 1 Inhaler 3    ibuprofen (MOTRIN) 800 mg tablet Take 1 tablet (800 mg total) by mouth every 6 (six) hours as needed for mild pain 30 tablet 0    ondansetron (ZOFRAN-ODT) 4 mg disintegrating tablet Take 1 tablet (4 mg total) by mouth every 6 (six) hours as needed for nausea or vomiting 20 tablet 0    Phenyleph-Doxylamine-DM-APAP (NYQUIL SEVERE COLD/FLU) 5-6 25- MG/15ML LIQD Take 15 mL by mouth daily at bedtime 1 Bottle 0    promethazine (PHENERGAN) 25 mg tablet Take 1 tablet (25 mg total) by mouth every 6 (six) hours as needed for nausea or vomiting 30 tablet 0     No current facility-administered medications for this visit  Review of Systems   Constitutional: Positive for chills and fever  Negative for activity change, appetite change, diaphoresis, fatigue and unexpected weight change     HENT: Negative for congestion, dental problem, drooling, ear discharge, ear pain, facial swelling, hearing loss, nosebleeds, postnasal drip, rhinorrhea, sinus pain, sinus pressure, sneezing, sore throat, tinnitus, trouble swallowing and voice change  Eyes: Negative for photophobia, pain, discharge, redness, itching and visual disturbance  Respiratory: Negative for apnea, cough, choking, chest tightness, shortness of breath, wheezing and stridor  Cardiovascular: Negative for chest pain, palpitations and leg swelling  Gastrointestinal: Negative for abdominal distention, abdominal pain, anal bleeding, blood in stool, constipation, diarrhea, nausea, rectal pain and vomiting  Endocrine: Negative for cold intolerance, heat intolerance, polydipsia, polyphagia and polyuria  Genitourinary: Negative for decreased urine volume, difficulty urinating, dysuria, enuresis, flank pain, frequency, genital sores, hematuria and urgency  Musculoskeletal: Positive for myalgias  Negative for arthralgias, back pain, gait problem, joint swelling, neck pain and neck stiffness  Skin: Negative for color change, pallor, rash and wound  Neurological: Negative for dizziness, tremors, seizures, syncope, facial asymmetry, speech difficulty, weakness, light-headedness, numbness and headaches  Hematological: Negative for adenopathy  Does not bruise/bleed easily  Psychiatric/Behavioral: Positive for dysphoric mood  Negative for agitation, behavioral problems, confusion, decreased concentration, hallucinations, self-injury, sleep disturbance and suicidal ideas  The patient is nervous/anxious  The patient is not hyperactive  Objective:  Vitals:    01/23/19 1147   BP: 112/80   BP Location: Left arm   Patient Position: Sitting   Pulse: (!) 53   Temp: 98 5 °F (36 9 °C)   TempSrc: Oral   SpO2: 99%   Weight: 72 5 kg (159 lb 12 8 oz)     Body mass index is 32 28 kg/m²  Physical Exam   Constitutional: She is oriented to person, place, and time   She appears well-developed  Obese   HENT:   Head: Normocephalic  Right Ear: External ear normal    Left Ear: External ear normal    Mouth/Throat: Oropharynx is clear and moist    Eyes: Pupils are equal, round, and reactive to light  Conjunctivae are normal    Neck: Neck supple  No thyromegaly present  Cardiovascular: Normal rate, regular rhythm, normal heart sounds and intact distal pulses  Pulmonary/Chest: Effort normal and breath sounds normal    Abdominal: Soft  Bowel sounds are normal    Musculoskeletal: Normal range of motion  Neurological: She is alert and oriented to person, place, and time  She has normal reflexes  Skin: Skin is warm and dry     A lot of tattoos

## 2019-04-15 ENCOUNTER — TELEPHONE (OUTPATIENT)
Dept: INTERNAL MEDICINE CLINIC | Facility: CLINIC | Age: 34
End: 2019-04-15

## 2021-10-23 ENCOUNTER — APPOINTMENT (EMERGENCY)
Dept: RADIOLOGY | Facility: HOSPITAL | Age: 36
End: 2021-10-23
Payer: COMMERCIAL

## 2021-10-23 ENCOUNTER — HOSPITAL ENCOUNTER (EMERGENCY)
Facility: HOSPITAL | Age: 36
Discharge: HOME | End: 2021-10-23
Attending: EMERGENCY MEDICINE | Admitting: EMERGENCY MEDICINE
Payer: COMMERCIAL

## 2021-10-23 VITALS
SYSTOLIC BLOOD PRESSURE: 141 MMHG | RESPIRATION RATE: 19 BRPM | HEART RATE: 77 BPM | OXYGEN SATURATION: 94 % | DIASTOLIC BLOOD PRESSURE: 79 MMHG | TEMPERATURE: 98.2 F

## 2021-10-23 DIAGNOSIS — S82.851A TRIMALLEOLAR FRACTURE, RIGHT, CLOSED, INITIAL ENCOUNTER: Primary | ICD-10-CM

## 2021-10-23 PROCEDURE — 99284 EMERGENCY DEPT VISIT MOD MDM: CPT

## 2021-10-23 PROCEDURE — 99285 EMERGENCY DEPT VISIT HI MDM: CPT | Performed by: EMERGENCY MEDICINE

## 2021-10-23 PROCEDURE — 73610 X-RAY EXAM OF ANKLE: CPT

## 2021-10-23 PROCEDURE — 96374 THER/PROPH/DIAG INJ IV PUSH: CPT

## 2021-10-23 PROCEDURE — 96375 TX/PRO/DX INJ NEW DRUG ADDON: CPT

## 2021-10-23 PROCEDURE — 99156 MOD SED OTH PHYS/QHP 5/>YRS: CPT | Performed by: EMERGENCY MEDICINE

## 2021-10-23 RX ORDER — DIPHENHYDRAMINE HYDROCHLORIDE 50 MG/ML
50 INJECTION INTRAMUSCULAR; INTRAVENOUS ONCE
Status: COMPLETED | OUTPATIENT
Start: 2021-10-23 | End: 2021-10-23

## 2021-10-23 RX ORDER — MORPHINE SULFATE 15 MG/1
30 TABLET ORAL EVERY 4 HOURS PRN
Status: DISCONTINUED | OUTPATIENT
Start: 2021-10-23 | End: 2021-10-23 | Stop reason: HOSPADM

## 2021-10-23 RX ORDER — ONDANSETRON 2 MG/ML
4 INJECTION INTRAMUSCULAR; INTRAVENOUS ONCE
Status: COMPLETED | OUTPATIENT
Start: 2021-10-23 | End: 2021-10-23

## 2021-10-23 RX ORDER — HYDROMORPHONE HCL/PF 1 MG/ML
1 SYRINGE (ML) INJECTION ONCE
Status: COMPLETED | OUTPATIENT
Start: 2021-10-23 | End: 2021-10-23

## 2021-10-23 RX ORDER — ONDANSETRON 4 MG/1
4 TABLET, ORALLY DISINTEGRATING ORAL EVERY 8 HOURS PRN
Qty: 20 TABLET | Refills: 0 | Status: ON HOLD | OUTPATIENT
Start: 2021-10-23 | End: 2021-11-15 | Stop reason: ALTCHOICE

## 2021-10-23 RX ORDER — KETAMINE HCL IN NACL, ISO-OSM 100MG/10ML
200 SYRINGE (ML) INJECTION ONCE
Status: COMPLETED | OUTPATIENT
Start: 2021-10-23 | End: 2021-10-23

## 2021-10-23 RX ORDER — FENTANYL CITRATE 50 UG/ML
1 INJECTION, SOLUTION INTRAMUSCULAR; INTRAVENOUS ONCE
Status: COMPLETED | OUTPATIENT
Start: 2021-10-23 | End: 2021-10-23

## 2021-10-23 RX ORDER — MORPHINE SULFATE 30 MG/1
30 TABLET ORAL EVERY 4 HOURS PRN
Qty: 20 TABLET | Refills: 0 | Status: SHIPPED | OUTPATIENT
Start: 2021-10-23 | End: 2021-10-26

## 2021-10-23 RX ORDER — PROPOFOL 10 MG/ML
100 INJECTION, EMULSION INTRAVENOUS ONCE
Status: DISCONTINUED | OUTPATIENT
Start: 2021-10-23 | End: 2021-10-23 | Stop reason: HOSPADM

## 2021-10-23 RX ADMIN — Medication 200 MG: at 08:04

## 2021-10-23 RX ADMIN — MORPHINE SULFATE 30 MG: 15 TABLET ORAL at 12:40

## 2021-10-23 RX ADMIN — HYDROMORPHONE HYDROCHLORIDE 1 MG: 1 INJECTION, SOLUTION INTRAMUSCULAR; INTRAVENOUS; SUBCUTANEOUS at 07:31

## 2021-10-23 RX ADMIN — ONDANSETRON 4 MG: 2 INJECTION INTRAMUSCULAR; INTRAVENOUS at 07:28

## 2021-10-23 RX ADMIN — DIPHENHYDRAMINE HYDROCHLORIDE 50 MG: 50 INJECTION, SOLUTION INTRAMUSCULAR; INTRAVENOUS at 07:29

## 2021-11-02 ENCOUNTER — HOSPITAL ENCOUNTER (OUTPATIENT)
Dept: CT IMAGING | Facility: HOSPITAL | Age: 36
Discharge: HOME | End: 2021-11-02
Payer: COMMERCIAL

## 2021-11-02 ENCOUNTER — OFFICE VISIT (OUTPATIENT)
Dept: OBGYN CLINIC | Facility: CLINIC | Age: 36
End: 2021-11-02
Payer: COMMERCIAL

## 2021-11-02 ENCOUNTER — APPOINTMENT (OUTPATIENT)
Dept: LAB | Facility: HOSPITAL | Age: 36
End: 2021-11-02
Payer: COMMERCIAL

## 2021-11-02 VITALS
HEIGHT: 59 IN | SYSTOLIC BLOOD PRESSURE: 123 MMHG | BODY MASS INDEX: 32.05 KG/M2 | WEIGHT: 159 LBS | HEART RATE: 109 BPM | DIASTOLIC BLOOD PRESSURE: 84 MMHG

## 2021-11-02 DIAGNOSIS — S82.851A CLOSED TRIMALLEOLAR FRACTURE OF RIGHT ANKLE, INITIAL ENCOUNTER: ICD-10-CM

## 2021-11-02 DIAGNOSIS — S82.851A CLOSED TRIMALLEOLAR FRACTURE OF RIGHT ANKLE, INITIAL ENCOUNTER: Primary | ICD-10-CM

## 2021-11-02 LAB
ANION GAP SERPL CALCULATED.3IONS-SCNC: 10 MMOL/L (ref 4–13)
B-HCG SERPL-ACNC: <2 MIU/ML
BASOPHILS # BLD AUTO: 0.06 THOUSANDS/ΜL (ref 0–0.1)
BASOPHILS NFR BLD AUTO: 1 % (ref 0–1)
BUN SERPL-MCNC: 14 MG/DL (ref 5–25)
CALCIUM SERPL-MCNC: 8.5 MG/DL (ref 8.3–10.1)
CHLORIDE SERPL-SCNC: 105 MMOL/L (ref 100–108)
CO2 SERPL-SCNC: 26 MMOL/L (ref 21–32)
CREAT SERPL-MCNC: 0.71 MG/DL (ref 0.6–1.3)
EOSINOPHIL # BLD AUTO: 0.11 THOUSAND/ΜL (ref 0–0.61)
EOSINOPHIL NFR BLD AUTO: 1 % (ref 0–6)
ERYTHROCYTE [DISTWIDTH] IN BLOOD BY AUTOMATED COUNT: 15.7 % (ref 11.6–15.1)
GFR SERPL CREATININE-BSD FRML MDRD: 111 ML/MIN/1.73SQ M
GLUCOSE SERPL-MCNC: 82 MG/DL (ref 65–140)
HCT VFR BLD AUTO: 42.2 % (ref 34.8–46.1)
HGB BLD-MCNC: 13.2 G/DL (ref 11.5–15.4)
IMM GRANULOCYTES # BLD AUTO: 0.05 THOUSAND/UL (ref 0–0.2)
IMM GRANULOCYTES NFR BLD AUTO: 1 % (ref 0–2)
LYMPHOCYTES # BLD AUTO: 2.37 THOUSANDS/ΜL (ref 0.6–4.47)
LYMPHOCYTES NFR BLD AUTO: 22 % (ref 14–44)
MCH RBC QN AUTO: 25.1 PG (ref 26.8–34.3)
MCHC RBC AUTO-ENTMCNC: 31.3 G/DL (ref 31.4–37.4)
MCV RBC AUTO: 80 FL (ref 82–98)
MONOCYTES # BLD AUTO: 0.62 THOUSAND/ΜL (ref 0.17–1.22)
MONOCYTES NFR BLD AUTO: 6 % (ref 4–12)
NEUTROPHILS # BLD AUTO: 7.6 THOUSANDS/ΜL (ref 1.85–7.62)
NEUTS SEG NFR BLD AUTO: 69 % (ref 43–75)
NRBC BLD AUTO-RTO: 0 /100 WBCS
PLATELET # BLD AUTO: 358 THOUSANDS/UL (ref 149–390)
PMV BLD AUTO: 9.3 FL (ref 8.9–12.7)
POTASSIUM SERPL-SCNC: 3.3 MMOL/L (ref 3.5–5.3)
RBC # BLD AUTO: 5.25 MILLION/UL (ref 3.81–5.12)
SODIUM SERPL-SCNC: 141 MMOL/L (ref 136–145)
WBC # BLD AUTO: 10.81 THOUSAND/UL (ref 4.31–10.16)

## 2021-11-02 PROCEDURE — 36415 COLL VENOUS BLD VENIPUNCTURE: CPT

## 2021-11-02 PROCEDURE — 80048 BASIC METABOLIC PNL TOTAL CA: CPT

## 2021-11-02 PROCEDURE — 84702 CHORIONIC GONADOTROPIN TEST: CPT

## 2021-11-02 PROCEDURE — 99204 OFFICE O/P NEW MOD 45 MIN: CPT | Performed by: ORTHOPAEDIC SURGERY

## 2021-11-02 PROCEDURE — 73700 CT LOWER EXTREMITY W/O DYE: CPT

## 2021-11-02 PROCEDURE — 85025 COMPLETE CBC W/AUTO DIFF WBC: CPT

## 2021-11-02 RX ORDER — ASPIRIN 81 MG/1
81 TABLET ORAL DAILY
Qty: 60 TABLET | Refills: 0 | Status: SHIPPED | OUTPATIENT
Start: 2021-11-02

## 2021-11-02 RX ORDER — NALOXONE HYDROCHLORIDE 4 MG/.1ML
SPRAY NASAL
Qty: 1 EACH | Refills: 1 | Status: SHIPPED | OUTPATIENT
Start: 2021-11-02

## 2021-11-02 RX ORDER — OXYCODONE HYDROCHLORIDE 5 MG/1
5 TABLET ORAL EVERY 4 HOURS PRN
Qty: 30 TABLET | Refills: 0 | Status: SHIPPED | OUTPATIENT
Start: 2021-11-02

## 2021-11-03 PROBLEM — S82.851A CLOSED TRIMALLEOLAR FRACTURE OF RIGHT ANKLE: Status: ACTIVE | Noted: 2021-11-03

## 2021-11-08 ENCOUNTER — TELEPHONE (OUTPATIENT)
Dept: OBGYN CLINIC | Facility: CLINIC | Age: 36
End: 2021-11-08

## 2021-11-15 ENCOUNTER — ANESTHESIA (OUTPATIENT)
Dept: PERIOP | Facility: HOSPITAL | Age: 36
End: 2021-11-15
Payer: COMMERCIAL

## 2021-11-15 ENCOUNTER — APPOINTMENT (OUTPATIENT)
Dept: RADIOLOGY | Facility: HOSPITAL | Age: 36
End: 2021-11-15
Payer: COMMERCIAL

## 2021-11-15 ENCOUNTER — ANESTHESIA EVENT (OUTPATIENT)
Dept: PERIOP | Facility: HOSPITAL | Age: 36
End: 2021-11-15
Payer: COMMERCIAL

## 2021-11-15 ENCOUNTER — HOSPITAL ENCOUNTER (OUTPATIENT)
Facility: HOSPITAL | Age: 36
Setting detail: OUTPATIENT SURGERY
Discharge: HOME | End: 2021-11-15
Attending: ORTHOPAEDIC SURGERY | Admitting: ORTHOPAEDIC SURGERY
Payer: COMMERCIAL

## 2021-11-15 VITALS
BODY MASS INDEX: 35.56 KG/M2 | TEMPERATURE: 97.7 F | SYSTOLIC BLOOD PRESSURE: 121 MMHG | DIASTOLIC BLOOD PRESSURE: 69 MMHG | HEIGHT: 59 IN | WEIGHT: 176.37 LBS | HEART RATE: 81 BPM | OXYGEN SATURATION: 97 % | RESPIRATION RATE: 18 BRPM

## 2021-11-15 DIAGNOSIS — S82.851A CLOSED TRIMALLEOLAR FRACTURE OF RIGHT ANKLE, INITIAL ENCOUNTER: Primary | ICD-10-CM

## 2021-11-15 PROBLEM — F17.200 SMOKING: Status: ACTIVE | Noted: 2021-11-15

## 2021-11-15 LAB
EXT PREGNANCY TEST URINE: NEGATIVE
EXT. CONTROL: NORMAL
FLUAV RNA RESP QL NAA+PROBE: NEGATIVE
FLUBV RNA RESP QL NAA+PROBE: NEGATIVE
RSV RNA RESP QL NAA+PROBE: NEGATIVE
SARS-COV-2 RNA RESP QL NAA+PROBE: NEGATIVE

## 2021-11-15 PROCEDURE — C1713 ANCHOR/SCREW BN/BN,TIS/BN: HCPCS | Performed by: ORTHOPAEDIC SURGERY

## 2021-11-15 PROCEDURE — 27829 TREAT LOWER LEG JOINT: CPT | Performed by: ORTHOPAEDIC SURGERY

## 2021-11-15 PROCEDURE — 27823 TREATMENT OF ANKLE FRACTURE: CPT | Performed by: ORTHOPAEDIC SURGERY

## 2021-11-15 PROCEDURE — C1769 GUIDE WIRE: HCPCS | Performed by: ORTHOPAEDIC SURGERY

## 2021-11-15 PROCEDURE — 0241U HB NFCT DS VIR RESP RNA 4 TRGT: CPT | Performed by: ANESTHESIOLOGY

## 2021-11-15 PROCEDURE — 27829 TREAT LOWER LEG JOINT: CPT | Performed by: PHYSICIAN ASSISTANT

## 2021-11-15 PROCEDURE — 73610 X-RAY EXAM OF ANKLE: CPT

## 2021-11-15 PROCEDURE — 27823 TREATMENT OF ANKLE FRACTURE: CPT | Performed by: PHYSICIAN ASSISTANT

## 2021-11-15 PROCEDURE — 81025 URINE PREGNANCY TEST: CPT | Performed by: ANESTHESIOLOGY

## 2021-11-15 PROCEDURE — 27707 OSTEOTOMY FIBULA: CPT | Performed by: PHYSICIAN ASSISTANT

## 2021-11-15 PROCEDURE — 27707 OSTEOTOMY FIBULA: CPT | Performed by: ORTHOPAEDIC SURGERY

## 2021-11-15 DEVICE — 3.5MM CORTEX SCREW SELF-TAPPING 14MM: Type: IMPLANTABLE DEVICE | Site: ANKLE | Status: FUNCTIONAL

## 2021-11-15 DEVICE — 2.7MM LOCKING SCREW SLF-TPNG WITH T8 STARDRIVE RECESS 14MM: Type: IMPLANTABLE DEVICE | Site: ANKLE | Status: FUNCTIONAL

## 2021-11-15 DEVICE — 2.7MM CORTEX SCREW SELF-TAPPING 38MM: Type: IMPLANTABLE DEVICE | Site: ANKLE | Status: FUNCTIONAL

## 2021-11-15 DEVICE — 2.4MM CORTEX SCREW SLF-TPNG WITH T8 STARDRIVE RECESS 26MM: Type: IMPLANTABLE DEVICE | Site: ANKLE | Status: FUNCTIONAL

## 2021-11-15 DEVICE — 1.6MM KIRSCHNER WIRE W/TROCAR POINT 150MM: Type: IMPLANTABLE DEVICE | Site: ANKLE | Status: FUNCTIONAL

## 2021-11-15 DEVICE — 2.4MM CORTEX SCREW SLF-TPNG WITH T8 STARDRIVE RECESS 28MM: Type: IMPLANTABLE DEVICE | Site: ANKLE | Status: FUNCTIONAL

## 2021-11-15 DEVICE — 2.4MM LCP(TM) T-PLATE 3 HOLES HEAD/7 HOLES SHAFT
Type: IMPLANTABLE DEVICE | Site: ANKLE | Status: FUNCTIONAL
Brand: LCP

## 2021-11-15 DEVICE — 2.4MM CORTEX SCREW SLF-TPNG WITH T8 STARDRIVE RECESS-32MM: Type: IMPLANTABLE DEVICE | Site: ANKLE | Status: FUNCTIONAL

## 2021-11-15 DEVICE — 4.0MM CANNULATED SCREW-LONG THREAD 58MM: Type: IMPLANTABLE DEVICE | Site: ANKLE | Status: FUNCTIONAL

## 2021-11-15 DEVICE — 3.5MM CORTEX SCREW SELF-TAPPING 46MM: Type: IMPLANTABLE DEVICE | Site: ANKLE | Status: FUNCTIONAL

## 2021-11-15 DEVICE — 2.7MM/3.5MM LCP LATERAL DISTAL FIBULA PLATE 6H/RIGHT/112MM
Type: IMPLANTABLE DEVICE | Site: ANKLE | Status: FUNCTIONAL
Brand: LCP

## 2021-11-15 DEVICE — 2.7MM LOCKING SCREW SLF-TPNG WITH T8 STARDRIVE RECESS 10MM: Type: IMPLANTABLE DEVICE | Site: ANKLE | Status: FUNCTIONAL

## 2021-11-15 DEVICE — GRAFT BONE CANCELLOUS CHIPS 30ML: Type: IMPLANTABLE DEVICE | Site: ANKLE | Status: FUNCTIONAL

## 2021-11-15 DEVICE — 2.7MM CORTEX SCREW SELF-TAPPING 36MM: Type: IMPLANTABLE DEVICE | Site: ANKLE | Status: FUNCTIONAL

## 2021-11-15 DEVICE — 2.7MM LOCKING SCREW SLF-TPNG WITH T8 STARDRIVE RECESS 16MM: Type: IMPLANTABLE DEVICE | Site: ANKLE | Status: FUNCTIONAL

## 2021-11-15 RX ORDER — ONDANSETRON 2 MG/ML
4 INJECTION INTRAMUSCULAR; INTRAVENOUS ONCE AS NEEDED
Status: DISCONTINUED | OUTPATIENT
Start: 2021-11-15 | End: 2021-11-15 | Stop reason: HOSPADM

## 2021-11-15 RX ORDER — ONDANSETRON 2 MG/ML
4 INJECTION INTRAMUSCULAR; INTRAVENOUS EVERY 6 HOURS PRN
Status: DISCONTINUED | OUTPATIENT
Start: 2021-11-15 | End: 2021-11-15 | Stop reason: HOSPADM

## 2021-11-15 RX ORDER — PROPOFOL 10 MG/ML
INJECTION, EMULSION INTRAVENOUS AS NEEDED
Status: DISCONTINUED | OUTPATIENT
Start: 2021-11-15 | End: 2021-11-15

## 2021-11-15 RX ORDER — ROPIVACAINE HYDROCHLORIDE 2 MG/ML
INJECTION, SOLUTION EPIDURAL; INFILTRATION; PERINEURAL AS NEEDED
Status: DISCONTINUED | OUTPATIENT
Start: 2021-11-15 | End: 2021-11-15

## 2021-11-15 RX ORDER — OXYCODONE HYDROCHLORIDE 5 MG/1
5 TABLET ORAL EVERY 4 HOURS PRN
Qty: 30 TABLET | Refills: 0 | Status: SHIPPED | OUTPATIENT
Start: 2021-11-15 | End: 2021-11-25

## 2021-11-15 RX ORDER — ONDANSETRON 2 MG/ML
INJECTION INTRAMUSCULAR; INTRAVENOUS AS NEEDED
Status: DISCONTINUED | OUTPATIENT
Start: 2021-11-15 | End: 2021-11-15

## 2021-11-15 RX ORDER — LIDOCAINE HYDROCHLORIDE 10 MG/ML
INJECTION, SOLUTION EPIDURAL; INFILTRATION; INTRACAUDAL; PERINEURAL AS NEEDED
Status: DISCONTINUED | OUTPATIENT
Start: 2021-11-15 | End: 2021-11-15

## 2021-11-15 RX ORDER — ASPIRIN 81 MG/1
81 TABLET ORAL 2 TIMES DAILY
Qty: 60 TABLET | Refills: 0 | Status: SHIPPED | OUTPATIENT
Start: 2021-11-15

## 2021-11-15 RX ORDER — MIDAZOLAM HYDROCHLORIDE 2 MG/2ML
INJECTION, SOLUTION INTRAMUSCULAR; INTRAVENOUS
Status: COMPLETED | OUTPATIENT
Start: 2021-11-15 | End: 2021-11-15

## 2021-11-15 RX ORDER — ROPIVACAINE HYDROCHLORIDE 5 MG/ML
INJECTION, SOLUTION EPIDURAL; INFILTRATION; PERINEURAL
Status: COMPLETED | OUTPATIENT
Start: 2021-11-15 | End: 2021-11-15

## 2021-11-15 RX ORDER — SODIUM CHLORIDE, SODIUM LACTATE, POTASSIUM CHLORIDE, CALCIUM CHLORIDE 600; 310; 30; 20 MG/100ML; MG/100ML; MG/100ML; MG/100ML
125 INJECTION, SOLUTION INTRAVENOUS CONTINUOUS
Status: DISCONTINUED | OUTPATIENT
Start: 2021-11-15 | End: 2021-11-15 | Stop reason: HOSPADM

## 2021-11-15 RX ORDER — FENTANYL CITRATE/PF 50 MCG/ML
25 SYRINGE (ML) INJECTION
Status: DISCONTINUED | OUTPATIENT
Start: 2021-11-15 | End: 2021-11-15 | Stop reason: HOSPADM

## 2021-11-15 RX ORDER — SODIUM CHLORIDE, SODIUM LACTATE, POTASSIUM CHLORIDE, CALCIUM CHLORIDE 600; 310; 30; 20 MG/100ML; MG/100ML; MG/100ML; MG/100ML
20 INJECTION, SOLUTION INTRAVENOUS CONTINUOUS
Status: DISCONTINUED | OUTPATIENT
Start: 2021-11-15 | End: 2021-11-15 | Stop reason: HOSPADM

## 2021-11-15 RX ORDER — TRAMADOL HYDROCHLORIDE 50 MG/1
50 TABLET ORAL EVERY 6 HOURS SCHEDULED
Status: DISCONTINUED | OUTPATIENT
Start: 2021-11-15 | End: 2021-11-15 | Stop reason: HOSPADM

## 2021-11-15 RX ORDER — NEOSTIGMINE METHYLSULFATE 1 MG/ML
INJECTION INTRAVENOUS AS NEEDED
Status: DISCONTINUED | OUTPATIENT
Start: 2021-11-15 | End: 2021-11-15

## 2021-11-15 RX ORDER — CEFAZOLIN SODIUM 2 G/50ML
2000 SOLUTION INTRAVENOUS ONCE
Status: DISCONTINUED | OUTPATIENT
Start: 2021-11-15 | End: 2021-11-15

## 2021-11-15 RX ORDER — MAGNESIUM HYDROXIDE 1200 MG/15ML
LIQUID ORAL AS NEEDED
Status: DISCONTINUED | OUTPATIENT
Start: 2021-11-15 | End: 2021-11-15 | Stop reason: HOSPADM

## 2021-11-15 RX ORDER — GLYCOPYRROLATE 0.2 MG/ML
INJECTION INTRAMUSCULAR; INTRAVENOUS AS NEEDED
Status: DISCONTINUED | OUTPATIENT
Start: 2021-11-15 | End: 2021-11-15

## 2021-11-15 RX ORDER — ROCURONIUM BROMIDE 10 MG/ML
INJECTION, SOLUTION INTRAVENOUS AS NEEDED
Status: DISCONTINUED | OUTPATIENT
Start: 2021-11-15 | End: 2021-11-15

## 2021-11-15 RX ORDER — CEFAZOLIN SODIUM 2 G/50ML
SOLUTION INTRAVENOUS AS NEEDED
Status: DISCONTINUED | OUTPATIENT
Start: 2021-11-15 | End: 2021-11-15

## 2021-11-15 RX ORDER — ACETAMINOPHEN 325 MG/1
650 TABLET ORAL EVERY 6 HOURS PRN
Status: DISCONTINUED | OUTPATIENT
Start: 2021-11-15 | End: 2021-11-15 | Stop reason: HOSPADM

## 2021-11-15 RX ORDER — DEXAMETHASONE SODIUM PHOSPHATE 10 MG/ML
INJECTION, SOLUTION INTRAMUSCULAR; INTRAVENOUS AS NEEDED
Status: DISCONTINUED | OUTPATIENT
Start: 2021-11-15 | End: 2021-11-15

## 2021-11-15 RX ORDER — HYDROMORPHONE HCL/PF 1 MG/ML
0.2 SYRINGE (ML) INJECTION
Status: DISCONTINUED | OUTPATIENT
Start: 2021-11-15 | End: 2021-11-15 | Stop reason: HOSPADM

## 2021-11-15 RX ORDER — METOCLOPRAMIDE HYDROCHLORIDE 5 MG/ML
10 INJECTION INTRAMUSCULAR; INTRAVENOUS ONCE AS NEEDED
Status: DISCONTINUED | OUTPATIENT
Start: 2021-11-15 | End: 2021-11-15 | Stop reason: HOSPADM

## 2021-11-15 RX ORDER — FENTANYL CITRATE 50 UG/ML
INJECTION, SOLUTION INTRAMUSCULAR; INTRAVENOUS
Status: COMPLETED | OUTPATIENT
Start: 2021-11-15 | End: 2021-11-15

## 2021-11-15 RX ADMIN — MIDAZOLAM HYDROCHLORIDE 2 MG: 1 INJECTION, SOLUTION INTRAMUSCULAR; INTRAVENOUS at 13:40

## 2021-11-15 RX ADMIN — SODIUM CHLORIDE, SODIUM LACTATE, POTASSIUM CHLORIDE, AND CALCIUM CHLORIDE: .6; .31; .03; .02 INJECTION, SOLUTION INTRAVENOUS at 11:50

## 2021-11-15 RX ADMIN — DEXAMETHASONE SODIUM PHOSPHATE 4 MG: 10 INJECTION, SOLUTION INTRAMUSCULAR; INTRAVENOUS at 14:48

## 2021-11-15 RX ADMIN — PROPOFOL 200 MG: 10 INJECTION, EMULSION INTRAVENOUS at 14:41

## 2021-11-15 RX ADMIN — ROPIVACAINE HYDROCHLORIDE 20 ML: 5 INJECTION, SOLUTION EPIDURAL; INFILTRATION; PERINEURAL at 13:40

## 2021-11-15 RX ADMIN — ROPIVACAINE HYDROCHLORIDE 10 ML: 2 INJECTION, SOLUTION EPIDURAL; INFILTRATION at 13:40

## 2021-11-15 RX ADMIN — ROCURONIUM BROMIDE 50 MG: 10 INJECTION, SOLUTION INTRAVENOUS at 14:41

## 2021-11-15 RX ADMIN — ONDANSETRON 4 MG: 2 INJECTION INTRAMUSCULAR; INTRAVENOUS at 17:25

## 2021-11-15 RX ADMIN — GLYCOPYRROLATE 0.4 MG: 0.2 INJECTION, SOLUTION INTRAMUSCULAR; INTRAVENOUS at 17:48

## 2021-11-15 RX ADMIN — ROPIVACAINE HYDROCHLORIDE 10 ML: 5 INJECTION, SOLUTION EPIDURAL; INFILTRATION; PERINEURAL at 13:44

## 2021-11-15 RX ADMIN — LIDOCAINE HYDROCHLORIDE 50 MG: 10 INJECTION, SOLUTION EPIDURAL; INFILTRATION; INTRACAUDAL; PERINEURAL at 14:41

## 2021-11-15 RX ADMIN — NEOSTIGMINE METHYLSULFATE 3 MG: 1 INJECTION INTRAVENOUS at 17:48

## 2021-11-15 RX ADMIN — SODIUM CHLORIDE, SODIUM LACTATE, POTASSIUM CHLORIDE, AND CALCIUM CHLORIDE: .6; .31; .03; .02 INJECTION, SOLUTION INTRAVENOUS at 18:01

## 2021-11-15 RX ADMIN — FENTANYL CITRATE 100 MCG: 50 INJECTION, SOLUTION INTRAMUSCULAR; INTRAVENOUS at 13:40

## 2021-11-15 RX ADMIN — ROPIVACAINE HYDROCHLORIDE 10 ML: 2 INJECTION, SOLUTION EPIDURAL; INFILTRATION at 13:44

## 2021-11-15 RX ADMIN — CEFAZOLIN SODIUM 2000 MG: 2 SOLUTION INTRAVENOUS at 14:45

## 2021-11-16 ENCOUNTER — TELEPHONE (OUTPATIENT)
Dept: OBGYN CLINIC | Facility: HOSPITAL | Age: 36
End: 2021-11-16

## 2021-11-17 ENCOUNTER — TELEPHONE (OUTPATIENT)
Dept: OBGYN CLINIC | Facility: HOSPITAL | Age: 36
End: 2021-11-17

## 2021-11-17 ENCOUNTER — NURSE TRIAGE (OUTPATIENT)
Dept: OTHER | Facility: OTHER | Age: 36
End: 2021-11-17

## 2021-11-17 DIAGNOSIS — S82.851A CLOSED TRIMALLEOLAR FRACTURE OF RIGHT ANKLE, INITIAL ENCOUNTER: Primary | ICD-10-CM

## 2021-11-17 RX ORDER — IBUPROFEN 800 MG/1
800 TABLET ORAL EVERY 6 HOURS PRN
Qty: 30 TABLET | Refills: 0 | Status: SHIPPED | OUTPATIENT
Start: 2021-11-17

## 2021-11-17 RX ORDER — ACETAMINOPHEN 325 MG/1
975 TABLET ORAL 3 TIMES DAILY PRN
Qty: 60 TABLET | Refills: 0 | Status: SHIPPED | OUTPATIENT
Start: 2021-11-17

## 2021-11-28 DIAGNOSIS — S82.851A CLOSED TRIMALLEOLAR FRACTURE OF RIGHT ANKLE, INITIAL ENCOUNTER: Primary | ICD-10-CM

## 2021-11-30 ENCOUNTER — OFFICE VISIT (OUTPATIENT)
Dept: OBGYN CLINIC | Facility: CLINIC | Age: 36
End: 2021-11-30

## 2021-11-30 ENCOUNTER — APPOINTMENT (OUTPATIENT)
Dept: RADIOLOGY | Facility: CLINIC | Age: 36
End: 2021-11-30
Payer: COMMERCIAL

## 2021-11-30 VITALS — WEIGHT: 176 LBS | HEIGHT: 59 IN | BODY MASS INDEX: 35.48 KG/M2

## 2021-11-30 DIAGNOSIS — S82.851A CLOSED TRIMALLEOLAR FRACTURE OF RIGHT ANKLE, INITIAL ENCOUNTER: Primary | ICD-10-CM

## 2021-11-30 DIAGNOSIS — S82.851A CLOSED TRIMALLEOLAR FRACTURE OF RIGHT ANKLE, INITIAL ENCOUNTER: ICD-10-CM

## 2021-11-30 PROCEDURE — 73610 X-RAY EXAM OF ANKLE: CPT

## 2021-11-30 PROCEDURE — 99024 POSTOP FOLLOW-UP VISIT: CPT | Performed by: ORTHOPAEDIC SURGERY

## 2021-12-26 DIAGNOSIS — S82.851A CLOSED TRIMALLEOLAR FRACTURE OF RIGHT ANKLE, INITIAL ENCOUNTER: Primary | ICD-10-CM

## 2021-12-29 ENCOUNTER — VBI (OUTPATIENT)
Dept: ADMINISTRATIVE | Facility: OTHER | Age: 36
End: 2021-12-29

## 2022-03-28 ENCOUNTER — HOSPITAL ENCOUNTER (EMERGENCY)
Facility: HOSPITAL | Age: 37
Discharge: HOME | End: 2022-03-29
Attending: EMERGENCY MEDICINE

## 2022-03-28 ENCOUNTER — APPOINTMENT (EMERGENCY)
Dept: RADIOLOGY | Facility: HOSPITAL | Age: 37
End: 2022-03-28

## 2022-03-28 VITALS
TEMPERATURE: 97 F | BODY MASS INDEX: 35.56 KG/M2 | HEART RATE: 94 BPM | DIASTOLIC BLOOD PRESSURE: 90 MMHG | HEIGHT: 59 IN | WEIGHT: 176.37 LBS | SYSTOLIC BLOOD PRESSURE: 162 MMHG | RESPIRATION RATE: 24 BRPM | OXYGEN SATURATION: 99 %

## 2022-03-28 DIAGNOSIS — R05.9 COUGH: Primary | ICD-10-CM

## 2022-03-28 LAB
ANION GAP SERPL CALCULATED.3IONS-SCNC: 9 MMOL/L (ref 4–13)
BASOPHILS # BLD AUTO: 0.05 THOUSANDS/ΜL (ref 0–0.1)
BASOPHILS NFR BLD AUTO: 1 % (ref 0–1)
BUN SERPL-MCNC: 12 MG/DL (ref 5–25)
CALCIUM SERPL-MCNC: 9.2 MG/DL (ref 8.3–10.1)
CARDIAC TROPONIN I PNL SERPL HS: 4 NG/L
CHLORIDE SERPL-SCNC: 105 MMOL/L (ref 100–108)
CO2 SERPL-SCNC: 24 MMOL/L (ref 21–32)
CREAT SERPL-MCNC: 0.7 MG/DL (ref 0.6–1.3)
EOSINOPHIL # BLD AUTO: 0.07 THOUSAND/ΜL (ref 0–0.61)
EOSINOPHIL NFR BLD AUTO: 1 % (ref 0–6)
ERYTHROCYTE [DISTWIDTH] IN BLOOD BY AUTOMATED COUNT: 16 % (ref 11.6–15.1)
FLUAV RNA RESP QL NAA+PROBE: NEGATIVE
FLUBV RNA RESP QL NAA+PROBE: NEGATIVE
GFR SERPL CREATININE-BSD FRML MDRD: 111 ML/MIN/1.73SQ M
GLUCOSE SERPL-MCNC: 94 MG/DL (ref 65–140)
HCT VFR BLD AUTO: 40.6 % (ref 34.8–46.1)
HGB BLD-MCNC: 12.4 G/DL (ref 11.5–15.4)
IMM GRANULOCYTES # BLD AUTO: 0.04 THOUSAND/UL (ref 0–0.2)
IMM GRANULOCYTES NFR BLD AUTO: 0 % (ref 0–2)
LYMPHOCYTES # BLD AUTO: 3.21 THOUSANDS/ΜL (ref 0.6–4.47)
LYMPHOCYTES NFR BLD AUTO: 31 % (ref 14–44)
MCH RBC QN AUTO: 23.4 PG (ref 26.8–34.3)
MCHC RBC AUTO-ENTMCNC: 30.5 G/DL (ref 31.4–37.4)
MCV RBC AUTO: 77 FL (ref 82–98)
MONOCYTES # BLD AUTO: 0.68 THOUSAND/ΜL (ref 0.17–1.22)
MONOCYTES NFR BLD AUTO: 7 % (ref 4–12)
NEUTROPHILS # BLD AUTO: 6.2 THOUSANDS/ΜL (ref 1.85–7.62)
NEUTS SEG NFR BLD AUTO: 60 % (ref 43–75)
NRBC BLD AUTO-RTO: 0 /100 WBCS
PLATELET # BLD AUTO: 380 THOUSANDS/UL (ref 149–390)
PMV BLD AUTO: 9 FL (ref 8.9–12.7)
POTASSIUM SERPL-SCNC: 3.7 MMOL/L (ref 3.5–5.3)
RBC # BLD AUTO: 5.29 MILLION/UL (ref 3.81–5.12)
RSV RNA RESP QL NAA+PROBE: NEGATIVE
SARS-COV-2 RNA RESP QL NAA+PROBE: NEGATIVE
SODIUM SERPL-SCNC: 138 MMOL/L (ref 136–145)
WBC # BLD AUTO: 10.25 THOUSAND/UL (ref 4.31–10.16)

## 2022-03-28 PROCEDURE — 85025 COMPLETE CBC W/AUTO DIFF WBC: CPT | Performed by: EMERGENCY MEDICINE

## 2022-03-28 PROCEDURE — 71045 X-RAY EXAM CHEST 1 VIEW: CPT

## 2022-03-28 PROCEDURE — 80048 BASIC METABOLIC PNL TOTAL CA: CPT | Performed by: EMERGENCY MEDICINE

## 2022-03-28 PROCEDURE — 0241U HB NFCT DS VIR RESP RNA 4 TRGT: CPT | Performed by: EMERGENCY MEDICINE

## 2022-03-28 PROCEDURE — 99285 EMERGENCY DEPT VISIT HI MDM: CPT | Performed by: EMERGENCY MEDICINE

## 2022-03-28 PROCEDURE — 36415 COLL VENOUS BLD VENIPUNCTURE: CPT | Performed by: EMERGENCY MEDICINE

## 2022-03-28 PROCEDURE — 84484 ASSAY OF TROPONIN QUANT: CPT | Performed by: EMERGENCY MEDICINE

## 2022-03-28 PROCEDURE — 99285 EMERGENCY DEPT VISIT HI MDM: CPT

## 2022-03-28 PROCEDURE — 93005 ELECTROCARDIOGRAM TRACING: CPT

## 2022-03-28 RX ORDER — ALBUTEROL SULFATE 90 UG/1
2 AEROSOL, METERED RESPIRATORY (INHALATION) EVERY 6 HOURS PRN
Qty: 8.5 G | Refills: 0 | Status: SHIPPED | OUTPATIENT
Start: 2022-03-28

## 2022-03-28 NOTE — Clinical Note
Kojotj Penaloza was seen and treated in our emergency department on 3/28/2022  No restrictions            Diagnosis:     Pina Smith  may return to work on return date  She may return on this date: 03/28/2022    Medically cleared for incarceration     If you have any questions or concerns, please don't hesitate to call        Sonya Mireles MD    ______________________________           _______________          _______________  Hospital Representative                              Date                                Time

## 2022-03-29 LAB
ATRIAL RATE: 79 BPM
P AXIS: 61 DEGREES
PR INTERVAL: 144 MS
QRS AXIS: 258 DEGREES
QRSD INTERVAL: 76 MS
QT INTERVAL: 672 MS
QTC INTERVAL: 770 MS
T WAVE AXIS: 99 DEGREES
VENTRICULAR RATE: 79 BPM

## 2022-03-29 PROCEDURE — 93010 ELECTROCARDIOGRAM REPORT: CPT | Performed by: INTERNAL MEDICINE

## 2022-03-29 NOTE — ED PROVIDER NOTES
History  Chief Complaint   Patient presents with    Chest Pain     was on her way to the [facility] for chest pain and cough, escorted in by EMS and police    Cough       History provided by:  Patient   used: No    Cough  Cough characteristics:  Non-productive  Sputum characteristics:  Nondescript  Severity:  Moderate  Onset quality:  Gradual  Duration:  1 week  Timing:  Constant  Progression:  Unchanged  Chronicity:  New  Relieved by:  Nothing  Worsened by:  Nothing  Ineffective treatments:  None tried  Associated symptoms: chest pain    Associated symptoms: no chills, no ear pain, no fever, no rash, no shortness of breath and no sore throat        Prior to Admission Medications   Prescriptions Last Dose Informant Patient Reported? Taking?   acetaminophen (TYLENOL) 325 mg tablet   No No   Sig: Take 3 tablets (975 mg total) by mouth 3 (three) times a day as needed for mild pain   albuterol (VENTOLIN HFA) 90 mcg/act inhaler   No No   Sig: Inhale 2 puffs every 6 (six) hours as needed for wheezing   Patient taking differently: Inhale 2 puffs every 6 (six) hours as needed for wheezing (PRN)    aspirin (ECOTRIN LOW STRENGTH) 81 mg EC tablet   No No   Sig: Take 1 tablet (81 mg total) by mouth daily   aspirin (ECOTRIN LOW STRENGTH) 81 mg EC tablet   No No   Sig: Take 1 tablet (81 mg total) by mouth 2 (two) times a day   ibuprofen (MOTRIN) 800 mg tablet   No No   Sig: Take 1 tablet (800 mg total) by mouth every 6 (six) hours as needed for mild pain   naloxone (NARCAN) 4 mg/0 1 mL nasal spray   No No   Sig: Administer 1 spray into a nostril  If no response after 2-3 minutes, give another dose in the other nostril using a new spray     Patient not taking: Reported on 11/30/2021    oxyCODONE (Roxicodone) 5 immediate release tablet   No No   Sig: Take 1 tablet (5 mg total) by mouth every 4 (four) hours as needed for moderate painMax Daily Amount: 30 mg      Facility-Administered Medications: None No past medical history on file  Past Surgical History:   Procedure Laterality Date    NO PAST SURGERIES      MN OPEN TX TRIMALLEOLAR ANKLE FX W FIX PST LIP Right 11/15/2021    Procedure: OPEN REDUCTION W/ INTERNAL FIXATION (ORIF) RIGHT ANKLE TRIMALLEOLAR FRACTURE;  Surgeon: Brittany Frost MD;  Location: [facility] [place] OR;  Service: Orthopedics       Family History   Problem Relation Age of Onset    Diabetes Family      I have reviewed and agree with the history as documented  E-Cigarette/Vaping    E-Cigarette Use Never User      E-Cigarette/Vaping Substances    Nicotine No     THC No     CBD No     Flavoring No     Other No     Unknown No      Social History     Tobacco Use    Smoking status: Current Every Day Smoker     Types: Cigarettes    Smokeless tobacco: Never Used   Vaping Use    Vaping Use: Never used   Substance Use Topics    Alcohol use: No    Drug use: No       Review of Systems   Constitutional: Positive for fatigue  Negative for chills and fever  HENT: Negative for ear pain and sore throat  Eyes: Negative for pain and visual disturbance  Respiratory: Positive for cough  Negative for shortness of breath  Cardiovascular: Positive for chest pain  Negative for palpitations  Gastrointestinal: Negative for abdominal pain, nausea and vomiting  Genitourinary: Negative for dysuria and hematuria  Musculoskeletal: Negative for arthralgias and back pain  Skin: Negative for color change and rash  Neurological: Negative for seizures and syncope  All other systems reviewed and are negative  Physical Exam  Physical Exam  Vitals and nursing note reviewed  Constitutional:       General: She is not in acute distress  Appearance: She is well-developed  HENT:      Head: Normocephalic and atraumatic  Eyes:      Conjunctiva/sclera: Conjunctivae normal    Cardiovascular:      Rate and Rhythm: Normal rate and regular rhythm        Heart sounds: No murmur heard       Pulmonary:      Effort: Pulmonary effort is normal  No respiratory distress  Breath sounds: Normal breath sounds  Abdominal:      Palpations: Abdomen is soft  Tenderness: There is no abdominal tenderness  Musculoskeletal:         General: Normal range of motion  Cervical back: Neck supple  Right lower leg: No edema  Left lower leg: No edema  Skin:     General: Skin is warm and dry  Capillary Refill: Capillary refill takes less than 2 seconds  Neurological:      General: No focal deficit present  Mental Status: She is alert and oriented to person, place, and time  Vital Signs  ED Triage Vitals [03/28/22 2220]   Temperature Pulse Respirations Blood Pressure SpO2   (!) 97 °F (36 1 °C) 94 (!) 24 162/90 99 %      Temp src Heart Rate Source Patient Position - Orthostatic VS BP Location FiO2 (%)   -- Monitor -- -- --      Pain Score       --           Vitals:    03/28/22 2220   BP: 162/90   Pulse: 94         Visual Acuity      ED Medications  Medications - No data to display    Diagnostic Studies  Results Reviewed     Procedure Component Value Units Date/Time    HS Troponin I 4hr [[id number]]     Lab Status: No result Specimen: Blood     COVID/FLU/RSV - 2 hour TAT [[id number]]  (Normal) Collected: 03/28/22 2231    Lab Status: Final result Specimen: Nares from Nose Updated: 03/28/22 2321     SARS-CoV-2 Negative     INFLUENZA A PCR Negative     INFLUENZA B PCR Negative     RSV PCR Negative    Narrative:      FOR PEDIATRIC PATIENTS - copy/paste COVID Guidelines URL to browser: [email or web address]    SARS-CoV-2 assay is a Nucleic Acid Amplification assay intended for the  qualitative detection of nucleic acid from SARS-CoV-2 in nasopharyngeal  swabs  Results are for the presumptive identification of SARS-CoV-2 RNA      Positive results are indicative of infection with SARS-CoV-2, the virus  causing COVID-19, but do not rule out bacterial infection or co-infection  with other viruses  Laboratories within the [place] and its  territories are required to report all positive results to the appropriate  public health authorities  Negative results do not preclude SARS-CoV-2  infection and should not be used as the sole basis for treatment or other  patient management decisions  Negative results must be combined with  clinical observations, patient history, and epidemiological information  This test has not been FDA cleared or approved  This test has been authorized by FDA under an Emergency Use Authorization  (EUA)  This test is only authorized for the duration of time the  declaration that circumstances exist justifying the authorization of the  emergency use of an in vitro diagnostic tests for detection of SARS-CoV-2  virus and/or diagnosis of COVID-19 infection under section 564(b)(1) of  the Act, 21 U  S C  789JMJ-8(Y)(0), unless the authorization is terminated  or revoked sooner  The test has been validated but independent review by FDA  and CLIA is pending  Test performed using [employer] GeneXpert: This RT-PCR assay targets N2,  a region unique to SARS-CoV-2  A conserved region in the E-gene was chosen  for pan-Sarbecovirus detection which includes SARS-CoV-2      HS Troponin I 2hr [[id number]]     Lab Status: No result Specimen: Blood     HS Troponin 0hr (reflex protocol) [[id number]]  (Normal) Collected: 03/28/22 2232    Lab Status: Final result Specimen: Blood from Hand, Left Updated: 03/28/22 2311     hs TnI 0hr 4 ng/L     Basic metabolic panel [[id number]] Collected: 03/28/22 2232    Lab Status: Final result Specimen: Blood from Hand, Left Updated: 03/28/22 2259     Sodium 138 mmol/L      Potassium 3 7 mmol/L      Chloride 105 mmol/L      CO2 24 mmol/L      ANION GAP 9 mmol/L      BUN 12 mg/dL      Creatinine 0 70 mg/dL      Glucose 94 mg/dL      Calcium 9 2 mg/dL      eGFR 111 ml/min/1 73sq m     Narrative: National Kidney Disease Foundation guidelines for Chronic Kidney Disease (CKD):     Stage 1 with normal or high GFR (GFR > 90 mL/min/1 73 square meters)    Stage 2 Mild CKD (GFR = 60-89 mL/min/1 73 square meters)    Stage 3A Moderate CKD (GFR = 45-59 mL/min/1 73 square meters)    Stage 3B Moderate CKD (GFR = 30-44 mL/min/1 73 square meters)    Stage 4 Severe CKD (GFR = 15-29 mL/min/1 73 square meters)    Stage 5 End Stage CKD (GFR <15 mL/min/1 73 square meters)  Note: GFR calculation is accurate only with a steady state creatinine    CBC and differential [[id number]]  (Abnormal) Collected: 03/28/22 2232    Lab Status: Final result Specimen: Blood from Hand, Left Updated: 03/28/22 2242     WBC 10 25 Thousand/uL      RBC 5 29 Million/uL      Hemoglobin 12 4 g/dL      Hematocrit 40 6 %      MCV 77 fL      MCH 23 4 pg      MCHC 30 5 g/dL      RDW 16 0 %      MPV 9 0 fL      Platelets 504 Thousands/uL      nRBC 0 /100 WBCs      Neutrophils Relative 60 %      Immat GRANS % 0 %      Lymphocytes Relative 31 %      Monocytes Relative 7 %      Eosinophils Relative 1 %      Basophils Relative 1 %      Neutrophils Absolute 6 20 Thousands/µL      Immature Grans Absolute 0 04 Thousand/uL      Lymphocytes Absolute 3 21 Thousands/µL      Monocytes Absolute 0 68 Thousand/µL      Eosinophils Absolute 0 07 Thousand/µL      Basophils Absolute 0 05 Thousands/µL                  XR chest 1 view portable    (Results Pending)              Procedures  Procedures         ED Course             HEART Risk Score      Most Recent Value   Heart Score Risk Calculator    History 0 Filed at: 03/29/2022 0029   ECG 0 Filed at: 03/29/2022 0029   Age 0 Filed at: 03/29/2022 0029   Risk Factors 0 Filed at: 03/29/2022 0029   Troponin 0 Filed at: 03/29/2022 0029   HEART Score 0 Filed at: 03/29/2022 0029                PERC Rule for PE      Most Recent Value   PERC Rule for PE    Age >=50 0 Filed at: 03/29/2022 0029   HR >=100 0 Filed at: 03/29/2022 0029   O2 Sat on room air < 95% 0 Filed at: 03/29/2022 0029   History of PE or DVT 0 Filed at: 03/29/2022 6329   Recent trauma or surgery 0 Filed at: 03/29/2022 0029   Hemoptysis 0 Filed at: 03/29/2022 0029   Exogenous estrogen 0 Filed at: 03/29/2022 0029   Unilateral leg swelling 0 Filed at: 03/29/2022 0029   PERC Rule for PE Results 0 Filed at: 03/29/2022 0029                            [facility]  Number of Diagnoses or Management Options  Cough: new and requires workup  Diagnosis management comments: 36-year-old female was escorted to the emergency department by police after being arrested  Per police, patient was arrested due to outstanding warrants  The patient is complaining of cough and general malaise for about a week  She is also complaining of chest pain and tightness  Afebrile with reassuring vital signs here in the emergency department  She was tearful and mildly tachypneic on arrival but this improved  Chest x-ray negative for pneumonia or pneumothorax  EKG without acute ST changes to suggest cardiac ischemia  COVID and flu negative  PERC negative  Suspect uncomplicated bronchitis  Plan for albuterol MDI, discharged to police custody  Amount and/or Complexity of Data Reviewed  Clinical lab tests: ordered and reviewed  Tests in the radiology section of CPT®: ordered and reviewed  Independent visualization of images, tracings, or specimens: yes        Disposition  Final diagnoses:   Cough     Time reflects when diagnosis was documented in both MDM as applicable and the Disposition within this note     Time User Action Codes Description Comment    3/28/2022 11:49 PM Huber Pat Add [R05 9] Cough       ED Disposition     ED Disposition Condition Date/Time Comment    Discharge Stable Mon Mar 28, 2022 11:49 PM Omar Holman discharge to home/self care              Follow-up Information     Follow up With Specialties Details Why Contact Info    Elva Francis MD Internal Medicine   [address] [facility] [address]  [phone]            Discharge Medication List as of 3/28/2022 11:51 PM      START taking these medications    Details   !! albuterol (ProAir HFA) 90 mcg/act inhaler Inhale 2 puffs every 6 (six) hours as needed for wheezing, Starting Mon 3/28/2022, Print       !! - Potential duplicate medications found  Please discuss with provider  CONTINUE these medications which have NOT CHANGED    Details   acetaminophen (TYLENOL) 325 mg tablet Take 3 tablets (975 mg total) by mouth 3 (three) times a day as needed for mild pain, Starting Wed 11/17/2021, Normal      !! albuterol (VENTOLIN HFA) 90 mcg/act inhaler Inhale 2 puffs every 6 (six) hours as needed for wheezing, Starting Wed 1/23/2019, Normal      !! aspirin (ECOTRIN LOW STRENGTH) 81 mg EC tablet Take 1 tablet (81 mg total) by mouth daily, Starting Tue 11/2/2021, Normal      !! aspirin (ECOTRIN LOW STRENGTH) 81 mg EC tablet Take 1 tablet (81 mg total) by mouth 2 (two) times a day, Starting Mon 11/15/2021, Normal      ibuprofen (MOTRIN) 800 mg tablet Take 1 tablet (800 mg total) by mouth every 6 (six) hours as needed for mild pain, Starting Wed 11/17/2021, Normal      naloxone (NARCAN) 4 mg/0 1 mL nasal spray Administer 1 spray into a nostril  If no response after 2-3 minutes, give another dose in the other nostril using a new spray , Normal      oxyCODONE (Roxicodone) 5 immediate release tablet Take 1 tablet (5 mg total) by mouth every 4 (four) hours as needed for moderate painMax Daily Amount: 30 mg, Starting Tue 11/2/2021, Normal       !! - Potential duplicate medications found  Please discuss with provider  No discharge procedures on file      PDMP Review       Value Time User    PDMP Reviewed  Yes 11/15/2021  5:42 PM Pratik Snyder PA-C          ED Provider  Electronically Signed by           Jonatan Chung MD  03/29/22 0030

## 2022-05-04 ENCOUNTER — TELEPHONE (OUTPATIENT)
Dept: OBGYN CLINIC | Facility: HOSPITAL | Age: 37
End: 2022-05-04

## 2022-05-04 NOTE — TELEPHONE ENCOUNTER
Nurse Anoop Mujica from [facility] called to confirm appts  Confirmed appts  Nurse satisfied

## 2022-07-14 ENCOUNTER — VBI (OUTPATIENT)
Dept: ADMINISTRATIVE | Facility: OTHER | Age: 37
End: 2022-07-14

## 2023-03-03 ENCOUNTER — VBI (OUTPATIENT)
Dept: ADMINISTRATIVE | Facility: OTHER | Age: 38
End: 2023-03-03

## 2023-07-18 ENCOUNTER — VBI (OUTPATIENT)
Dept: ADMINISTRATIVE | Facility: OTHER | Age: 38
End: 2023-07-18

## 2024-07-22 ENCOUNTER — VBI (OUTPATIENT)
Dept: ADMINISTRATIVE | Facility: OTHER | Age: 39
End: 2024-07-22

## 2024-07-22 NOTE — TELEPHONE ENCOUNTER
07/22/24 1:04 PM     Chart reviewed for Pap Smear (HPV) aka Cervical Cancer Screening ; nothing is submitted to the patient's insurance at this time.     Bhakti Darby   [facility]

## (undated) DEVICE — ACE WRAP 4 IN UNSTERILE

## (undated) DEVICE — DRAPE C-ARM X-RAY

## (undated) DEVICE — DRAPE SHEET X-LG

## (undated) DEVICE — 3M™ STERI-STRIP™ REINFORCED ADHESIVE SKIN CLOSURES, R1546, 1/4 IN X 4 IN (6 MM X 100 MM), 10 STRIPS/ENVELOPE: Brand: 3M™ STERI-STRIP™

## (undated) DEVICE — SPONGE LAP 18 X 18 IN STRL RFD

## (undated) DEVICE — CURITY NON-ADHERENT STRIPS: Brand: CURITY

## (undated) DEVICE — SUT VICRYL 2-0 CT-1 27 IN J259H

## (undated) DEVICE — LIGHT HANDLE COVER SLEEVE DISP BLUE STELLAR

## (undated) DEVICE — DRAPE C-ARMOUR

## (undated) DEVICE — 2.5MM DRILL BIT/QC/GOLD/180MM

## (undated) DEVICE — GLOVE SRG BIOGEL ORTHOPEDIC 8

## (undated) DEVICE — PADDING CAST 4 IN  COTTON STRL

## (undated) DEVICE — SPONGE SCRUB 4 PCT CHLORHEXIDINE

## (undated) DEVICE — 2.7MM THREE-FLUTED DRILL BIT QC/125MM

## (undated) DEVICE — INTENDED FOR TISSUE SEPARATION, AND OTHER PROCEDURES THAT REQUIRE A SHARP SURGICAL BLADE TO PUNCTURE OR CUT.: Brand: BARD-PARKER SAFETY BLADES SIZE 15, STERILE

## (undated) DEVICE — ELECTRODE BLADE MOD E-Z CLEAN  2.75IN 7CM -0012AM

## (undated) DEVICE — ABDOMINAL PAD: Brand: DERMACEA

## (undated) DEVICE — KERLIX BANDAGE ROLL: Brand: KERLIX

## (undated) DEVICE — 1.8MM DRILL BIT WITH DEPTH MARK/QC/110MM

## (undated) DEVICE — ACE WRAP 6 IN STERILE

## (undated) DEVICE — BETHLEHEM UNIVERSAL  MIONR EXT: Brand: CARDINAL HEALTH

## (undated) DEVICE — PAD GROUNDING ADULT

## (undated) DEVICE — BANDAGE, ESMARK LF STR 6"X9' (20/CS): Brand: CYPRESS

## (undated) DEVICE — GLOVE INDICATOR PI UNDERGLOVE SZ 8 BLUE

## (undated) DEVICE — 2.7MM CORTEX SCREW SELF-TAPPING 22MM
Type: IMPLANTABLE DEVICE | Site: ANKLE | Status: NON-FUNCTIONAL
Removed: 2021-11-15

## (undated) DEVICE — CHLORAPREP HI-LITE 26ML ORANGE

## (undated) DEVICE — ACE WRAP 6 IN UNSTERILE

## (undated) DEVICE — COBAN 4 IN STERILE

## (undated) DEVICE — 2.0MM DRILL BIT/QC/125MM

## (undated) DEVICE — DRAPE EQUIPMENT RF WAND

## (undated) DEVICE — MAYO STAND COVER: Brand: CONVERTORS

## (undated) DEVICE — 1.25MM KIRSCHNER WIRE W/TROCAR POINT 150MM
Type: IMPLANTABLE DEVICE | Site: ANKLE | Status: NON-FUNCTIONAL
Removed: 2021-11-15

## (undated) DEVICE — TOWEL SURG XR DETECT GREEN STRL RFD

## (undated) DEVICE — 3M™ IOBAN™ 2 ANTIMICROBIAL INCISE DRAPE 6650EZ: Brand: IOBAN™ 2

## (undated) DEVICE — 3M™ STERI-DRAPE™ U-DRAPE 1015: Brand: STERI-DRAPE™

## (undated) DEVICE — 2.5MM DRILL BIT/QC/GOLD/110MM

## (undated) DEVICE — TUBING SUCTION 5MM X 12 FT

## (undated) DEVICE — 2.7MM CANNULATED DRILL BIT/QC 160MM

## (undated) DEVICE — SCD SEQUENTIAL COMPRESSION COMFORT SLEEVE MEDIUM KNEE LENGTH: Brand: KENDALL SCD

## (undated) DEVICE — GAUZE SPONGES,16 PLY: Brand: CURITY

## (undated) DEVICE — PENCIL ELECTROSURG E-Z CLEAN -0035H

## (undated) DEVICE — 1.25MM NON-THREADED GUIDE WIRE 150MM

## (undated) DEVICE — DRAPE SHEET THREE QUARTER

## (undated) DEVICE — SUT ETHILON 3-0 PS-1 18 IN 1663H